# Patient Record
Sex: FEMALE | Race: WHITE
[De-identification: names, ages, dates, MRNs, and addresses within clinical notes are randomized per-mention and may not be internally consistent; named-entity substitution may affect disease eponyms.]

---

## 2017-04-13 ENCOUNTER — HOSPITAL ENCOUNTER (OUTPATIENT)
Dept: HOSPITAL 62 - WI | Age: 42
End: 2017-04-13
Attending: OBSTETRICS & GYNECOLOGY
Payer: MEDICAID

## 2017-04-13 DIAGNOSIS — Z12.31: Primary | ICD-10-CM

## 2017-04-13 PROCEDURE — G0202 SCR MAMMO BI INCL CAD: HCPCS

## 2017-04-13 PROCEDURE — 77067 SCR MAMMO BI INCL CAD: CPT

## 2017-04-14 ENCOUNTER — HOSPITAL ENCOUNTER (EMERGENCY)
Dept: HOSPITAL 62 - ER | Age: 42
Discharge: HOME | End: 2017-04-14
Payer: MEDICAID

## 2017-04-14 VITALS — SYSTOLIC BLOOD PRESSURE: 140 MMHG | DIASTOLIC BLOOD PRESSURE: 70 MMHG

## 2017-04-14 DIAGNOSIS — Z87.891: ICD-10-CM

## 2017-04-14 DIAGNOSIS — Z98.51: ICD-10-CM

## 2017-04-14 DIAGNOSIS — R05: ICD-10-CM

## 2017-04-14 DIAGNOSIS — I10: ICD-10-CM

## 2017-04-14 DIAGNOSIS — R11.10: ICD-10-CM

## 2017-04-14 DIAGNOSIS — J40: Primary | ICD-10-CM

## 2017-04-14 DIAGNOSIS — R73.03: ICD-10-CM

## 2017-04-14 PROCEDURE — 99283 EMERGENCY DEPT VISIT LOW MDM: CPT

## 2017-04-14 NOTE — ER DOCUMENT REPORT
ED Respiratory Problem





- General


Chief Complaint: Productive Cough


Stated Complaint: COUGH


Notes: 


Patient sense emergency department complaining of 2 weeks of congested cough. 

She has nasal congestion as well. She went to an urgent care about a week ago 

and they started her on an antibiotic and has not helped. She tried using her 

friend's albuterol inhaler and doesn't feel like it is helping. The patient 

also started having coughing spells that resulted in her vomiting. She's had 

pain around her lower ribs on both sides from the coughing but that's been 

easing up. She is also had some pain in her hernia from the coughing as well. 

The patient says that yesterday she started having some diarrhea. Her 

significant other is also having diarrhea and vomiting presently. These is that 

she's felt subjective fever starting yesterday. She denies any other chest pain 

or difficult to breathing. She denies any history of asthma. She quit smoking 

back in December. She has had an albuterol inhaler prescribed to her once 

previously. The patient denies any. She denies any rashes or swelling. She 

denies any urinary symptoms. Denies pregnancy.


TRAVEL OUTSIDE OF THE [place] IN LAST 30 DAYS: No





- Related Data


Allergies/Adverse Reactions: 


 





chlorpheniramine [From [employer]] Allergy (Severe, Verified 17 13:41)


 headache, n and v


dextromethorphan tannate [From [facility]] Allergy (Severe, Verified 17 13:

41)


 headache, n and v


pseudoephedrine tannate [From [employer]] Allergy (Severe, Verified 17 13:41

)


 headache, n and v








Home Medications: 


 Current Home Medications





Cefdinir [Omnicef 300 mg Capsule] 1 cap PO BID 17 [History]


Guaifenesin [Tussin] 100 mg PO PRN PRN 17 [History]


Guaifenesin/Dextromethorphan [Mucus Relief Dm Tablet] 1 each PO 17 [

History]


Omeprazole 20 mg PO DAILY 17 [History]


Pantoprazole Sodium [Protonix] 20 mg PO DAILY 17 [History]











Past Medical History





- Social History


Smoking Status: Former Smoker


Lives with: Spouse/Significant other


Family History: CAD, DM, Hypertension, Thyroid Disfunction


Patient has suicidal ideation: No


Patient has homicidal ideation: No





- Past Medical History


Cardiac Medical History: Reports: Hx Hypertension - no current meds


   Denies: Hx Coronary Artery Disease, Hx Heart Attack


Pulmonary Medical History: Reports: Hx Bronchitis -  hx of, Hx Pneumonia - hx of


   Denies: Hx Asthma, Hx COPD


Neurological Medical History: Denies: Hx Cerebrovascular Accident, Hx Seizures


Endocrine Medical History: Reports: Hx Diabetes Mellitus Type 2 - "borderline" 

per pt


Renal/ Medical History: Denies: Hx Peritoneal Dialysis


GI Medical History: Reports: Hx Gastroesophageal Reflux Disease


Musculoskeltal Medical History: Denies Hx Arthritis


Psychiatric Medical History: Reports: Hx Depression - anxiety


Past Surgical History: Reports: Hx  Section - x2, Hx Gynecologic 

Surgery - novasure, vag repair, Hx Tonsillectomy, Hx Tubal Ligation





- Immunizations


Immunizations up to date: No


Hx Diphtheria, Pertussis, Tetanus Vaccination: No





Review of Systems





- Review of Systems


Notes: 


A total of 11 systems were reviewed. Pertinent positives and negatives are 

listed in the history of present illness.





Physical Exam





- Vital signs


Vitals: 


 











Temp Pulse Resp BP Pulse Ox


 


 98.3 F   73   22 H  139/69 H  99 


 


 17 13:18  17 13:18  17 13:18  17 13:18  17 13:18














- Notes


Notes: 


General presentation:  Well developed alert patient  who does not appear 

acutely ill or toxic.  


HEAD: Normocephalic and atraumatic.


ENT:  Neck is supple. Moist mucous membranes.  No pharyngeal erythema edema or 

exudates. Hearing intact to normal voice.  


Eye:  Pupils are equal, round.  Lids normal. Conjunctiva clear.  


Pulmonary: No respiratory distress. Inspiratory and expiratory wheezes 

bilaterally with good air excursion without rales, rhonchi.  


Circulatory:  Regular rate no significant murmurs, rubs, or gallop.  


Abdomen: Soft, nontender abdomen.  No palpable organomegaly or masses. Normal 

bowel sounds.


Neurologic:  Awake, alert, and oriented.  Grossly normal and symmetrical 

strength.  


MSK:  Normal strength and range of motion.  No swelling or tenderness.  


Skin:  Skin is warm and dry with no lesions or rash.  


Psychiatric:  Normal mood, affect, and cognition.  





Course





- Re-evaluation


Re-evalutation: 


17 14:10


Patient with cough for 2 weeks with history of smoking. Symptoms most 

consistent with bronchitis. She is not hypoxic. No respiratory distress. We'll 

treat with albuterol and steroids. Regarding the new onset of diarrhea, patient 

may have developed GI bug or this may be secondary to the antibiotics that the 

urgent care gave her 4 the bronchitis last week. She does not appear 

dehydrated. We'll recommend by mouth fluids





17 15:05


Patient remained stable. She has improved aeration with 2 puffs of albuterol. 

She has had the prednisone. I have counseled her regarding management of her 

symptoms. She will be discharged home.





- Vital Signs


Vital signs: 


 











Temp Pulse Resp BP Pulse Ox


 


 98.3 F   73   22 H  139/69 H  99 


 


 17 13:18  17 13:18  17 13:18  17 13:18  17 13:18














Discharge





- Discharge


Clinical Impression: 


 Bronchitis





Instructions:  Bronchitis ([facility])


Additional Instructions: 


Use inhaler every 4 hours as needed for wheezing and cough. Try Mucinex through 

the day to help thin secretions to cough sputum up. Use prednisone daily with 

next dose tomorrow with breakfast or lunch. Return for fevers not controlled by 

Tylenol and Motrin, worsening difficult to breathing, chest pain, or other 

worsening or concerning symptoms.














Bronchitis





     You have acute bronchitis.  This disease is an infection or inflammation 

of the air passageways in your lungs.  Symptoms usually include cough, low 

grade fever, shortness of breath, and wheezing.  The cough usually persists for 

a couple of weeks.


     Most cases of bronchitis get better without antibiotics.  We prescribe 

antibiotics when we believe bacteria are damaging your airways, or if there's 

high risk the bronchitis will worsen into pneumonia.


     Increase your fluid intake. A cool mist humidifier may make your lungs 

more comfortable.  An expectorant (cough medicine that loosens phlegm) can 

help.  If you smoke, STOP!!!  Recovery from bronchitis can be somewhat slow, 

but you should see improvement within a day or two.


     Repeated episodes of bronchitis may result in lung damage -- for example, 

chronic bronchitis, recurrent pneumonias, or emphysema.


     Call the doctor if you develop increasing fever, shortness of breath, 

chest pain, bloody sputum, or otherwise worsen.  If you have not improved at 

all after several days, contact the physician.








Prescriptions: 


Albuterol Sulfate [Proair HFA Inhalation Aerosol 8.5 gm MDI] 2 puff IH Q4H PRN #

1 mdi


 PRN Reason: 


Prednisone [Deltasone 20 mg Tablet] 3 tab PO DAILY 5 Days

## 2017-10-30 ENCOUNTER — HOSPITAL ENCOUNTER (OUTPATIENT)
Dept: HOSPITAL 62 - OD | Age: 42
End: 2017-10-30
Attending: FAMILY MEDICINE
Payer: MEDICAID

## 2017-10-30 DIAGNOSIS — B34.9: Primary | ICD-10-CM

## 2017-10-30 PROCEDURE — 87804 INFLUENZA ASSAY W/OPTIC: CPT

## 2019-06-09 ENCOUNTER — HOSPITAL ENCOUNTER (EMERGENCY)
Dept: HOSPITAL 62 - ER | Age: 44
Discharge: HOME | End: 2019-06-09
Payer: MEDICAID

## 2019-06-09 VITALS — SYSTOLIC BLOOD PRESSURE: 145 MMHG | DIASTOLIC BLOOD PRESSURE: 76 MMHG

## 2019-06-09 DIAGNOSIS — K21.9: ICD-10-CM

## 2019-06-09 DIAGNOSIS — Z98.51: ICD-10-CM

## 2019-06-09 DIAGNOSIS — I10: ICD-10-CM

## 2019-06-09 DIAGNOSIS — K43.9: Primary | ICD-10-CM

## 2019-06-09 DIAGNOSIS — Z79.899: ICD-10-CM

## 2019-06-09 DIAGNOSIS — R11.2: ICD-10-CM

## 2019-06-09 DIAGNOSIS — Z88.8: ICD-10-CM

## 2019-06-09 DIAGNOSIS — R10.32: ICD-10-CM

## 2019-06-09 DIAGNOSIS — Z98.84: ICD-10-CM

## 2019-06-09 LAB
ADD MANUAL DIFF: NO
ALBUMIN SERPL-MCNC: 4.7 G/DL (ref 3.5–5)
ALP SERPL-CCNC: 88 U/L (ref 38–126)
ALT SERPL-CCNC: 25 U/L (ref 9–52)
ANION GAP SERPL CALC-SCNC: 8 MMOL/L (ref 5–19)
APPEARANCE UR: (no result)
APTT PPP: YELLOW S
AST SERPL-CCNC: 21 U/L (ref 14–36)
BASOPHILS # BLD AUTO: 0.1 10^3/UL (ref 0–0.2)
BASOPHILS NFR BLD AUTO: 0.5 % (ref 0–2)
BILIRUB DIRECT SERPL-MCNC: 0.2 MG/DL (ref 0–0.4)
BILIRUB SERPL-MCNC: 0.7 MG/DL (ref 0.2–1.3)
BILIRUB UR QL STRIP: NEGATIVE
BUN SERPL-MCNC: 15 MG/DL (ref 7–20)
CALCIUM: 10.1 MG/DL (ref 8.4–10.2)
CHLORIDE SERPL-SCNC: 102 MMOL/L (ref 98–107)
CO2 SERPL-SCNC: 28 MMOL/L (ref 22–30)
EOSINOPHIL # BLD AUTO: 0.1 10^3/UL (ref 0–0.6)
EOSINOPHIL NFR BLD AUTO: 1.2 % (ref 0–6)
ERYTHROCYTE [DISTWIDTH] IN BLOOD BY AUTOMATED COUNT: 13.1 % (ref 11.5–14)
GLUCOSE SERPL-MCNC: 147 MG/DL (ref 75–110)
GLUCOSE UR STRIP-MCNC: NEGATIVE MG/DL
HCT VFR BLD CALC: 41.1 % (ref 36–47)
HGB BLD-MCNC: 13.9 G/DL (ref 12–15.5)
KETONES UR STRIP-MCNC: (no result) MG/DL
LIPASE SERPL-CCNC: 93.2 U/L (ref 23–300)
LYMPHOCYTES # BLD AUTO: 1.4 10^3/UL (ref 0.5–4.7)
LYMPHOCYTES NFR BLD AUTO: 13.4 % (ref 13–45)
MCH RBC QN AUTO: 28.2 PG (ref 27–33.4)
MCHC RBC AUTO-ENTMCNC: 33.9 G/DL (ref 32–36)
MCV RBC AUTO: 83 FL (ref 80–97)
MONOCYTES # BLD AUTO: 0.4 10^3/UL (ref 0.1–1.4)
MONOCYTES NFR BLD AUTO: 4.1 % (ref 3–13)
NEUTROPHILS # BLD AUTO: 8.5 10^3/UL (ref 1.7–8.2)
NEUTS SEG NFR BLD AUTO: 80.8 % (ref 42–78)
NITRITE UR QL STRIP: NEGATIVE
PH UR STRIP: 7 [PH] (ref 5–9)
PLATELET # BLD: 275 10^3/UL (ref 150–450)
POTASSIUM SERPL-SCNC: 4.8 MMOL/L (ref 3.6–5)
PROT SERPL-MCNC: 7.9 G/DL (ref 6.3–8.2)
PROT UR STRIP-MCNC: 30 MG/DL
RBC # BLD AUTO: 4.94 10^6/UL (ref 3.72–5.28)
SODIUM SERPL-SCNC: 138.2 MMOL/L (ref 137–145)
SP GR UR STRIP: 1.03
TOTAL CELLS COUNTED % (AUTO): 100 %
UROBILINOGEN UR-MCNC: 4 MG/DL (ref ?–2)
WBC # BLD AUTO: 10.6 10^3/UL (ref 4–10.5)

## 2019-06-09 PROCEDURE — 74177 CT ABD & PELVIS W/CONTRAST: CPT

## 2019-06-09 PROCEDURE — 83605 ASSAY OF LACTIC ACID: CPT

## 2019-06-09 PROCEDURE — 96374 THER/PROPH/DIAG INJ IV PUSH: CPT

## 2019-06-09 PROCEDURE — 80053 COMPREHEN METABOLIC PANEL: CPT

## 2019-06-09 PROCEDURE — 96361 HYDRATE IV INFUSION ADD-ON: CPT

## 2019-06-09 PROCEDURE — 96375 TX/PRO/DX INJ NEW DRUG ADDON: CPT

## 2019-06-09 PROCEDURE — 85025 COMPLETE CBC W/AUTO DIFF WBC: CPT

## 2019-06-09 PROCEDURE — 81001 URINALYSIS AUTO W/SCOPE: CPT

## 2019-06-09 PROCEDURE — 83690 ASSAY OF LIPASE: CPT

## 2019-06-09 PROCEDURE — 99285 EMERGENCY DEPT VISIT HI MDM: CPT

## 2019-06-09 PROCEDURE — 74022 RADEX COMPL AQT ABD SERIES: CPT

## 2019-06-09 PROCEDURE — 36415 COLL VENOUS BLD VENIPUNCTURE: CPT

## 2019-06-09 NOTE — ER DOCUMENT REPORT
ED General





- General


Chief Complaint: Abdominal Pain


Stated Complaint: LEFT SIDE PAIN/VOMITING


Time Seen by Provider: 19 00:13


Primary Care Provider: 


MAREN VILLALTA DO [NO LOCAL MD] - Follow up as needed


Notes: 





Patient is a 43-year-old female with a past medical history of morbid obesity, 

chronic large ventral abdominal hernia, status post gastric sleeve, presents 

with worsening pain to her knee particularly in the left lower quadrant.  The 

patient states that she constantly has a very large ventral hernia often has a 

increased prominence in the left lower quadrant although feels like it is more 

bulged out than normal.  She states several hours prior to presentation she 

began having a dull, throbbing, constant discomfort to the area that became 

progressively more painful over the last 4 to 5 hours now a severe pain.  States

that she vomited multiple times since the pain started.  She did try taking 

Pepto-Bismol with no relief.  She states that she has had similar pain in the 

past although this might be worse than any pain she is ever had in regards to 

the hernia and she has never vomited in the past with this pain.  She states the

vomiting is what did prompted her to come to the emergency department.  She has 

not had fever or constitutional symptoms.  No obvious triggering factor tonight.

 She has not seen her primary care doctor regarding today's concerns.


TRAVEL OUTSIDE OF THE [place] IN LAST 30 DAYS: No





- Related Data


Allergies/Adverse Reactions: 


                                        





chlorpheniramine [From [facility]] Allergy (Severe, Verified 17 13:41)


   headache, n and v


dextromethorphan tannate [From [facility]] Allergy (Severe, Verified 17 13:

41)


   headache, n and v


pseudoephedrine tannate [From [facility]] Allergy (Severe, Verified 17 13

:41)


   headache, n and v











Past Medical History





- General


Information source: Patient





- Social History


Smoking Status: Never Smoker


Frequency of alcohol use: None


Drug Abuse: None


Lives with: Family


Family History: CAD, DM, Hypertension, Thyroid Disfunction





- Past Medical History


Cardiac Medical History: Reports: Hx Hypertension - no current meds


   Denies: Hx Coronary Artery Disease, Hx Heart Attack


Pulmonary Medical History: Reports: Hx Bronchitis -  hx of, Hx Pneumonia - hx of


   Denies: Hx Asthma, Hx COPD


Neurological Medical History: Denies: Hx Cerebrovascular Accident, Hx Seizures


Endocrine Medical History: Reports: Hx Diabetes Mellitus Type 2 - "borderline" 

per pt


Renal/ Medical History: Denies: Hx Peritoneal Dialysis


GI Medical History: Reports: Hx Gastroesophageal Reflux Disease


Musculoskeletal Medical History: Denies Hx Arthritis


Psychiatric Medical History: Reports: Hx Depression - anxiety


Past Surgical History: Reports: Hx  Section - x2, Hx Gynecologic Surgery

- novasure, vag repair, Hx Tonsillectomy, Hx Tubal Ligation





- Immunizations


Immunizations up to date: No


Hx Diphtheria, Pertussis, Tetanus Vaccination: No





Review of Systems





- Review of Systems


Notes: 





Constitutional: Negative for fever.


HENT: Negative for sore throat.


Eyes: Negative for visual changes.


Cardiovascular: Negative for chest pain.


Respiratory: Negative for shortness of breath.


Gastrointestinal: Positive for abdominal pain, nausea and vomiting


Genitourinary: Negative for dysuria.


Musculoskeletal: Negative for back pain.


Skin: Negative for rash.


Neurological: Negative for headaches, weakness or numbness.





10 point ROS negative except as marked above and in HPI.





Physical Exam





- Vital signs


Vitals: 


                                        











Temp Pulse Resp BP Pulse Ox


 


 98.1 F   89   20   172/88 H  100 


 


 19 00:09  19 00:09  19 00:09  19 00:19 00:09











Interpretation: Hypertensive


Notes: 





PHYSICAL EXAMINATION:





GENERAL: Appears very uncomfortable





HEAD: Atraumatic, normocephalic.





EYES: Pupils equal round and reactive to light, extraocular movements intact, 

sclera anicteric, conjunctiva are normal.





ENT: nares patent, oropharynx clear without exudates.  Moist mucous membranes.





NECK: Normal range of motion, supple without lymphadenopathy





LUNGS: Breath sounds clear to auscultation bilaterally and equal.  No wheezes 

rales or rhonchi.





HEART: Regular rate and rhythm without murmurs





ABDOMEN: Morbidly obese abdomen, extremely large ventral hernia with an 

increased protuberance in the left lower quadrant portion of the ventral hernia.

 There is diffuse tenderness on palpation of the hernia although it is overall 

soft and compressible including over the left lower quadrant.  Bowel sounds are 

present.





EXTREMITIES: Normal range of motion, no pitting or edema.  No cyanosis.





NEUROLOGICAL: No focal neurological deficits. Moves all extremities 

spontaneously and on command.





PSYCH: Normal mood, normal affect.





SKIN: Warm, Dry, normal turgor, no rashes or lesions noted.





Course





- Re-evaluation


Re-evalutation: 





19 01:02


Patient presents with severe left-sided lower abdominal pain.  Has a very large 

ventral abdominal hernia that is constantly present but there is an area of 

bulging to the left lower abdomen that patient states is always there but does 

not usually protrude this much.  The patient has exquisite tenderness on 

palpation of this area although it is soft and compressible.  She has had 

recurrent episodes of vomiting.  I did immediately contact the surgeon on-call 

Dr. Saul due to concerns of possible incarcerated hernia.  He requested CT 

scan of the abdomen and pelvis prior to formally consulting and evaluating the 

patient.  This will be completed at his request.  Labs pending.  Will provide 

analgesia, IV fluids and reassess the patient.


19 04:17


CT scan completed.  Dr. Saul has consulted on the patient, advises patient 

is safe for discharge home.  Patient does appear much more comfortable, no 

longer in any kind of distress.  At this time will discharge with return precaut

ions and follow-up recommendations.  Verbal discharge instructions given a the 

bedside and opportunity for questions given. Medication warnings reviewed. 

Patient is in agreement with this plan and has verbalized understanding of 

return precautions and the need for primary care follow-up in the next 24-72 

hours.





- Vital Signs


Vital signs: 


                                        











Temp Pulse Resp BP Pulse Ox


 


 98.1 F   89   20   172/88 H  100 


 


 19 00:09  19 00:09  19 00:09  19 00:09  19 00:09














- Laboratory


Result Diagrams: 


                                 19 00:15





                                 19 00:15


Laboratory results interpreted by me: 


                                        











  19





  00:15 00:15 00:15


 


WBC  10.6 H  


 


Seg Neutrophils %  80.8 H  


 


Absolute Neutrophils  8.5 H  


 


Glucose   147 H 


 


Urine Protein    30 H


 


Urine Ketones    TRACE H


 


Urine Urobilinogen    4.0 H


 


Ur Leukocyte Esterase    LARGE H














- Diagnostic Test


Radiology reviewed: Reports reviewed





Discharge





- Discharge


Clinical Impression: 


Ventral hernia


Qualifiers:


 Obstruction and gangrene presence: without obstruction or gangrene Qualified 

Code(s): K43.9 - Ventral hernia without obstruction or gangrene





Abdominal pain


Qualifiers:


 Abdominal location: unspecified location Qualified Code(s): R10.9 - Unspecified

abdominal pain





Nausea and vomiting


Qualifiers:


 Vomiting type: unspecified Vomiting Intractability: non-intractable Qualified 

Code(s): R11.2 - Nausea with vomiting, unspecified





Condition: Good


Disposition: HOME, SELF-CARE


Additional Instructions: 


Keep a clear liquid diet for the next 48 hours.  Take stool softeners 2 tabs 

docusate twice daily.  Return if you have worsening pain, fever, persistent 

vomiting, pass out, or have any other symptoms that are worrisome to you.


Referrals: 


MAREN VILLALTA DO [NO LOCAL MD] - Follow up in 3-5 days

## 2019-06-09 NOTE — RADIOLOGY REPORT ([facility])
CLINICAL HISTORY:  SHARP ABD PAIN, HX BYPASS, VOMITING 



COMPARISON: None.



TECHNIQUE: XR ABDOMEN SUPINE AND ERECT WITH CHEST (ABD ACUTE

SERIES) 6/9/2019 12:00 AM CDT



FINDINGS: 



Bowel gas pattern is nonspecific. There are no abnormal

radiopaque foreign bodies or abnormal calcifications. There are

mild degenerative changes most of the heart is normal in size.

Lungs are clear.



IMPRESSION: 



No bowel obstruction.

## 2019-06-09 NOTE — RADIOLOGY REPORT ([facility])
CLINICAL HISTORY:  abdominal pain, vomiting, ?incarcerated hernia





COMPARISON: None.



TECHNIQUE: CT ABDOMEN PELVIS WITH IV CONTRAST on 6/9/2019 12:51

AM CDT



This exam was performed according to our departmental

dose-optimization program, which includes automated exposure

control, adjustment of the mA and/or kV according to patient size

and/or use of iterative reconstruction technique.



FINDINGS: 



Lower lungs are clear.



Abdomen: The liver is normal in appearance. There is no biliary

dilatation. Gallbladder is normal in appearance. There are

postoperative changes of the upper stomach. The pancreas and

spleen are normal in appearance. The adrenal glands and kidneys

are unremarkable.



Abdominal aorta is normal in course and caliber without aneurysm.

There is no free air. There is no retroperitoneal adenopathy.



Pelvis: There is dilatation of small bowel loops proximal to the

femoral hernias component. Distal small bowel is decompressed.

Urinary bladder is unremarkable. There is no free fluid. Uterus

is normal in size. Appendix is not clearly seen. There are large

complex ventral hernias extending into the pannus.



Skeleton: There are no acute osseous findings. No suspicious bony

lesions.



IMPRESSION: 



Complex ventral hernia containing multiple bowel loops. Small

bowel obstruction may secondarily

## 2019-06-09 NOTE — ER DOCUMENT REPORT
ED Medical Screen (RME)





- General


Chief Complaint: Abdominal Pain


Stated Complaint: LEFT SIDE PAIN/VOMITING


Time Seen by Provider: 19 00:13


Primary Care Provider: 


MAREN VILLALTA DO [Primary Care Provider] - Follow up as needed


Notes: 





43-year-old female chief complaint of severe mid to left-sided abdominal pain 

and vomiting.  She has a history of gastric bypass, she has a known very large 

hernia over the lower abdomen generally with large amount of intestines in the 

hernia persistently, had a gastric bypass to lose weight so she could have this 

repaired at [facility].  Denies fever but states she is started vomiting with 

severe pain today.


TRAVEL OUTSIDE OF THE [place] IN LAST 30 DAYS: No





- Related Data


Allergies/Adverse Reactions: 


                                        





chlorpheniramine [From [facility]] Allergy (Severe, Verified 17 13:41)


   headache, n and v


dextromethorphan tannate [From [facility]] Allergy (Severe, Verified 17 

13:41)


   headache, n and v


pseudoephedrine tannate [From [facility]] Allergy (Severe, Verified 17 

13:41)


   headache, n and v











Past Medical History





- Past Medical History


Cardiac Medical History: Reports: Hx Hypertension - no current meds


   Denies: Hx Coronary Artery Disease, Hx Heart Attack


Pulmonary Medical History: Reports: Hx Bronchitis -  hx of, Hx Pneumonia - hx of


   Denies: Hx Asthma, Hx COPD


Neurological Medical History: Denies: Hx Cerebrovascular Accident, Hx Seizures


Endocrine Medical History: Reports: Hx Diabetes Mellitus Type 2 - "borderline" 

per pt


Renal/ Medical History: Denies: Hx Peritoneal Dialysis


GI Medical History: Reports: Hx Gastroesophageal Reflux Disease


Musculoskeltal Medical History: Denies Hx Arthritis


Psychiatric Medical History: Reports: Hx Depression - anxiety


Past Surgical History: Reports: Hx  Section - x2, Hx Gynecologic Surgery

- novasure, vag repair, Hx Tonsillectomy, Hx Tubal Ligation





- Immunizations


Immunizations up to date: No


Hx Diphtheria, Pertussis, Tetanus Vaccination: No





Physical Exam





- Vital signs


Vitals: 





                                        











Temp Pulse Resp BP Pulse Ox


 


 98.1 F   89   20   172/88 H  100 


 


 19 00:09  19 00:09  19 00:09  19 00:09  19 00:09














- Abdominal


Tenderness: Tender - Very large distention of the lower abdomen, tenderness is 

actually worst above this in the left upper quadrant and mid abdomen, I do not 

see an incarcerated hernia on exam.





Course





- Re-evaluation


Re-evalutation: 


Patient appears to be guarding in the left upper quadrant and then mid abdomen 

although exam is limited by body habitus and sitting position.  Because of 

patient's level of distress with abdominal pain, history of gastric bypass and 

large hernia, she was upgraded to triage level 2





I have greeted and performed a rapid initial assessment of this patient.  A 

comprehensive ED assessment and evaluation of the patient, analysis of test 

results and completion of the medical decision making process will be conducted 

by additional ED providers.





- Vital Signs


Vital signs: 





                                        











Temp Pulse Resp BP Pulse Ox


 


 98.1 F   89   20   172/88 H  100 


 


 19 00:09  19 00:09  19 00:09  19 00:19 00:09














Doctor's Discharge





- Discharge


Referrals: 


MAREN VILLALTA DO [Primary Care Provider] - Follow up as needed

## 2019-06-09 NOTE — PDOC CONSULTATION
Consultation


Consult Date: 19


Provider Consulted: LLOYD CHURCH


Consult reason:: Abdominal pain ventral hernia





History of Present Illness


Admission Date/PCP: 


  





  KIKI REYNOLDS MD





History of Present Illness: 


YESSENIA TREVINO is a 43 year old female who presents with left-sided abdominal 

pain and nausea.  She has a long-standing history of a very large lower 

abdominal ventral hernia that resulted from previous  surgery.





The dates back to  last seen in the emergency room here.  She has undergone 

bariatric surgery and attempts at weight loss so that the hernia could be 

repaired.  She has seen multiple surgeons suggested that she lose weight that 

the hernia could not be repaired until she lost some significant amount of 

weight.  This resulted in bariatric surgery done at [facility] in 

[place] approximately 5 years ago.


Patient is only lost 15 to 25 pounds as her gastric sleeve surgery.


Tonight with increasing left-sided abdominal pain over the course of the last 

couple days has not really had a significant bowel movement however has been 

passing flatus.  Denies any severe abdominal pain however her pain is localized 

to the left side which she said is usual for her but over the last couple days 

has been somewhat worse.





Past Medical History


Cardiac Medical History: Reports: Hypertension - no current meds


   Denies: Coronary Artery Disease, Myocardial Infarction


Pulmonary Medical History: Reports: Bronchitis -  hx of, Pneumonia - hx of


   Denies: Asthma, Chronic Obstructive Pulmonary Disease (COPD)


Neurological Medical History: 


   Denies: Seizures


Endocrine Medical History: Reports: Diabetes Mellitus Type 2 - "borderline" per 

pt


GI Medical History: Reports: Gastroesophageal Reflux Disease


Musculoskeltal Medical History: 


   Denies: Arthritis


Psychiatric Medical History: Reports: Depression - anxiety


Hematology: 


   Denies: Anemia





Past Surgical History


Past Surgical History: Reports:  Section - x2, Tonsillectomy, Tubal 

Ligation, Other - Gastric sleeve surgery





Social History


Smoking Status: Former Smoker


Frequency of Alcohol Use: Rare


Hx Recreational Drug Use: No


Hx Prescription Drug Abuse: No





Family History


Family History: CAD, DM, Hypertension, Thyroid Disfunction


Parental Family History Reviewed: No


Children Family History Reviewed: NA


Sibling(s) Family History Reviewed.: NA





Medication/Allergy


Home Medications: 








Albuterol Sulfate [Proair HFA Inhalation Aerosol 8.5 gm MDI] 2 puff IH Q4H PRN 

#1 mdi 17 


Cefdinir [Omnicef 300 mg Capsule] 1 cap PO BID 17 


Guaifenesin [Tussin] 100 mg PO PRN PRN 17 


Guaifenesin/Dextromethorphan [Mucus Relief Dm Tablet] 1 each PO 17 


Omeprazole 20 mg PO DAILY 17 


Pantoprazole Sodium [Protonix] 20 mg PO DAILY 17 


Prednisone [Deltasone 20 mg Tablet] 3 tab PO DAILY 5 Days  tablet 17 








Allergies/Adverse Reactions: 


                                        





chlorpheniramine [From [employer] ] Allergy (Severe, Verified 17 13:41)


   headache, n and v


dextromethorphan tannate [From [employer] ] Allergy (Severe, Verified 17 

13:41)


   headache, n and v


pseudoephedrine tannate [From [employer] ] Allergy (Severe, Verified 17 

13:41)


   headache, n and v











Physical Exam


Vital Signs: 


                                        











Temp Pulse Resp BP Pulse Ox


 


 98.1 F   89   20   172/88 H  100 


 


 19 00:09  19 00:09  19 00:09  19 00:09  19 00:09








                                 Intake & Output











 19





 06:59 06:59 06:59


 


Intake Total   1000


 


Balance   1000


 


Weight   129.1 kg














Results


Laboratory Results: 


                                        





                                 19 00:15 





                                 19 00:15 





                                        











  19





  00:15 00:15 00:15


 


WBC  10.6 H  


 


RBC  4.94  


 


Hgb  13.9  


 


Hct  41.1  


 


MCV  83  


 


MCH  28.2  


 


MCHC  33.9  


 


RDW  13.1  


 


Plt Count  275  


 


Seg Neutrophils %  80.8 H  


 


Lymphocytes %  13.4  


 


Monocytes %  4.1  


 


Eosinophils %  1.2  


 


Basophils %  0.5  


 


Absolute Neutrophils  8.5 H  


 


Absolute Lymphocytes  1.4  


 


Absolute Monocytes  0.4  


 


Absolute Eosinophils  0.1  


 


Absolute Basophils  0.1  


 


Sodium   138.2 


 


Potassium   4.8 


 


Chloride   102 


 


Carbon Dioxide   28 


 


Anion Gap   8 


 


BUN   15 


 


Creatinine   0.58 


 


Est GFR ( Amer)   > 60 


 


Est GFR (Non-Af Amer)   > 60 


 


Glucose   147 H 


 


Lactic Acid    1.0


 


Calcium   10.1 


 


Total Bilirubin   0.7 


 


AST   21 


 


ALT   25 


 


Alkaline Phosphatase   88 


 


Total Protein   7.9 


 


Albumin   4.7 


 


Lipase   93.2 


 


Urine Color   


 


Urine Appearance   


 


Urine pH   


 


Ur Specific Gravity   


 


Urine Protein   


 


Urine Glucose (UA)   


 


Urine Ketones   


 


Urine Blood   


 


Urine Nitrite   


 


Ur Leukocyte Esterase   


 


Urine WBC (Auto)   


 


Urine RBC (Auto)   














  19





  00:15


 


WBC 


 


RBC 


 


Hgb 


 


Hct 


 


MCV 


 


MCH 


 


MCHC 


 


RDW 


 


Plt Count 


 


Seg Neutrophils % 


 


Lymphocytes % 


 


Monocytes % 


 


Eosinophils % 


 


Basophils % 


 


Absolute Neutrophils 


 


Absolute Lymphocytes 


 


Absolute Monocytes 


 


Absolute Eosinophils 


 


Absolute Basophils 


 


Sodium 


 


Potassium 


 


Chloride 


 


Carbon Dioxide 


 


Anion Gap 


 


BUN 


 


Creatinine 


 


Est GFR ( Amer) 


 


Est GFR (Non-Af Amer) 


 


Glucose 


 


Lactic Acid 


 


Calcium 


 


Total Bilirubin 


 


AST 


 


ALT 


 


Alkaline Phosphatase 


 


Total Protein 


 


Albumin 


 


Lipase 


 


Urine Color  YELLOW


 


Urine Appearance  SLIGHTLY-CLOUDY


 


Urine pH  7.0


 


Ur Specific Gravity  1.028


 


Urine Protein  30 H


 


Urine Glucose (UA)  NEGATIVE


 


Urine Ketones  TRACE H


 


Urine Blood  NEGATIVE


 


Urine Nitrite  NEGATIVE


 


Ur Leukocyte Esterase  LARGE H


 


Urine WBC (Auto)  5


 


Urine RBC (Auto)  3











Impressions: 


                                        





Acute Abdomen Series  19 00:00


IMPRESSION: 


 


No bowel obstruction.


 








Abdomen/Pelvis CT  19 00:51


IMPRESSION: 


 


Complex ventral hernia containing multiple bowel loops. Small


bowel obstruction may secondarily


 














Assessment & Plan





- Plan Summary


Plan Summary: 





Impression: Long-standing ventral hernia with chronic abdominal pain status post

gastric sleeve surgery.


Her abdominal pain has markedly improved since her emergency room stay





Long discussion with the patient and family we went over the CT scan findings





I explained that any type of attempted repair of this large ventral hernia at 

this point not any significant weight loss we will almost 100% result in failure





Plain that her left-sided abdominal pain right now is secondary not to a bowel 

obstruction but to the hernia defect and the small bowel and colon sliding over 

the edges of her remaining fascia.





Patient also complains of chronic constipation she exacerbates her current 

problem





After long discussion with the patient and her family is encouraged to follow-up

with her bariatric program they may be able to assist her with a type of back on

track program for further weight loss.





Short-term she is instructed to stay on a clear liquid diet for the next 48 

hours and start taking stool softeners for the constipation





She is also instructed to return here should she start developing increasing 

nausea and vomiting and increasing abdominal pain

## 2019-07-02 ENCOUNTER — HOSPITAL ENCOUNTER (EMERGENCY)
Dept: HOSPITAL 62 - ER | Age: 44
Discharge: HOME | End: 2019-07-02
Payer: MEDICAID

## 2019-07-02 VITALS — SYSTOLIC BLOOD PRESSURE: 144 MMHG | DIASTOLIC BLOOD PRESSURE: 79 MMHG

## 2019-07-02 DIAGNOSIS — Z88.8: ICD-10-CM

## 2019-07-02 DIAGNOSIS — I10: ICD-10-CM

## 2019-07-02 DIAGNOSIS — R19.7: ICD-10-CM

## 2019-07-02 DIAGNOSIS — J44.9: ICD-10-CM

## 2019-07-02 DIAGNOSIS — K43.6: Primary | ICD-10-CM

## 2019-07-02 DIAGNOSIS — K21.9: ICD-10-CM

## 2019-07-02 DIAGNOSIS — Z79.899: ICD-10-CM

## 2019-07-02 DIAGNOSIS — D72.829: ICD-10-CM

## 2019-07-02 DIAGNOSIS — Z98.84: ICD-10-CM

## 2019-07-02 DIAGNOSIS — R11.2: ICD-10-CM

## 2019-07-02 DIAGNOSIS — E66.01: ICD-10-CM

## 2019-07-02 DIAGNOSIS — Z87.891: ICD-10-CM

## 2019-07-02 DIAGNOSIS — R73.9: ICD-10-CM

## 2019-07-02 DIAGNOSIS — Z98.890: ICD-10-CM

## 2019-07-02 LAB
ADD MANUAL DIFF: NO
ALBUMIN SERPL-MCNC: 4.7 G/DL (ref 3.5–5)
ALP SERPL-CCNC: 72 U/L (ref 38–126)
ALT SERPL-CCNC: 23 U/L (ref 9–52)
ANION GAP SERPL CALC-SCNC: 12 MMOL/L (ref 5–19)
APPEARANCE UR: (no result)
APTT PPP: YELLOW S
AST SERPL-CCNC: 24 U/L (ref 14–36)
BASOPHILS # BLD AUTO: 0.1 10^3/UL (ref 0–0.2)
BASOPHILS NFR BLD AUTO: 0.7 % (ref 0–2)
BILIRUB DIRECT SERPL-MCNC: 0.4 MG/DL (ref 0–0.4)
BILIRUB SERPL-MCNC: 0.9 MG/DL (ref 0.2–1.3)
BILIRUB UR QL STRIP: NEGATIVE
BUN SERPL-MCNC: 11 MG/DL (ref 7–20)
CALCIUM: 9.5 MG/DL (ref 8.4–10.2)
CHLORIDE SERPL-SCNC: 105 MMOL/L (ref 98–107)
CO2 SERPL-SCNC: 24 MMOL/L (ref 22–30)
EOSINOPHIL # BLD AUTO: 0.1 10^3/UL (ref 0–0.6)
EOSINOPHIL NFR BLD AUTO: 1.1 % (ref 0–6)
ERYTHROCYTE [DISTWIDTH] IN BLOOD BY AUTOMATED COUNT: 13.4 % (ref 11.5–14)
GLUCOSE SERPL-MCNC: 131 MG/DL (ref 75–110)
GLUCOSE UR STRIP-MCNC: NEGATIVE MG/DL
HCT VFR BLD CALC: 41.8 % (ref 36–47)
HGB BLD-MCNC: 14 G/DL (ref 12–15.5)
KETONES UR STRIP-MCNC: NEGATIVE MG/DL
LIPASE SERPL-CCNC: 77.3 U/L (ref 23–300)
LYMPHOCYTES # BLD AUTO: 1.1 10^3/UL (ref 0.5–4.7)
LYMPHOCYTES NFR BLD AUTO: 9.5 % (ref 13–45)
MCH RBC QN AUTO: 28 PG (ref 27–33.4)
MCHC RBC AUTO-ENTMCNC: 33.5 G/DL (ref 32–36)
MCV RBC AUTO: 84 FL (ref 80–97)
MONOCYTES # BLD AUTO: 0.3 10^3/UL (ref 0.1–1.4)
MONOCYTES NFR BLD AUTO: 2.7 % (ref 3–13)
NEUTROPHILS # BLD AUTO: 9.6 10^3/UL (ref 1.7–8.2)
NEUTS SEG NFR BLD AUTO: 86 % (ref 42–78)
NITRITE UR QL STRIP: NEGATIVE
PH UR STRIP: 9 [PH] (ref 5–9)
PLATELET # BLD: 279 10^3/UL (ref 150–450)
POTASSIUM SERPL-SCNC: 4.5 MMOL/L (ref 3.6–5)
PROT SERPL-MCNC: 7.9 G/DL (ref 6.3–8.2)
PROT UR STRIP-MCNC: NEGATIVE MG/DL
RBC # BLD AUTO: 5.01 10^6/UL (ref 3.72–5.28)
SODIUM SERPL-SCNC: 140.5 MMOL/L (ref 137–145)
SP GR UR STRIP: 1.02
TOTAL CELLS COUNTED % (AUTO): 100 %
UROBILINOGEN UR-MCNC: NEGATIVE MG/DL (ref ?–2)
WBC # BLD AUTO: 11.1 10^3/UL (ref 4–10.5)

## 2019-07-02 PROCEDURE — 85025 COMPLETE CBC W/AUTO DIFF WBC: CPT

## 2019-07-02 PROCEDURE — 83690 ASSAY OF LIPASE: CPT

## 2019-07-02 PROCEDURE — 74177 CT ABD & PELVIS W/CONTRAST: CPT

## 2019-07-02 PROCEDURE — 81001 URINALYSIS AUTO W/SCOPE: CPT

## 2019-07-02 PROCEDURE — 80053 COMPREHEN METABOLIC PANEL: CPT

## 2019-07-02 PROCEDURE — 36415 COLL VENOUS BLD VENIPUNCTURE: CPT

## 2019-07-02 PROCEDURE — 96376 TX/PRO/DX INJ SAME DRUG ADON: CPT

## 2019-07-02 PROCEDURE — 74250 X-RAY XM SM INT 1CNTRST STD: CPT

## 2019-07-02 PROCEDURE — 96374 THER/PROPH/DIAG INJ IV PUSH: CPT

## 2019-07-02 PROCEDURE — 96375 TX/PRO/DX INJ NEW DRUG ADDON: CPT

## 2019-07-02 PROCEDURE — 99284 EMERGENCY DEPT VISIT MOD MDM: CPT

## 2019-07-02 NOTE — ER DOCUMENT REPORT
ED General





- General


TRAVEL OUTSIDE OF THE [place] IN LAST 30 DAYS: No





<TODD OLSON A - Last Filed: 19 14:25>





<JAKE MOORE RAY - Last Filed: 19 17:55>





- General


Chief Complaint: Abdominal Pain


Stated Complaint: ABDOMINAL PAIN AND VOMITING


Time Seen by Provider: 19 06:20


Primary Care Provider: 


KIKI REYNOLDS MD [Primary Care Provider] - Follow up as needed





- [facility]


Notes: 





Patient is a 43-year-old female that presents to the emergency department for 

chief complaint of hernia pain.


Patient reports history of left-sided hernia after a .  The hernia has 

been there for years and does frequently give her pain.  She states the last 

time it was this severe was about a month ago.  Patient states last night while 

at rest she had sudden onset of severe sharp pain in her left side where the 

hernia is located.  She states she was passing gas yesterday and did have a 

loose bowel movement this morning.  Patient reports nausea with  multiple 

episodes of vomiting starting last night.  She denies fevers or chills.  She 

denies taking home medications for pain.





Past Medical History: Chronic ventral hernia, COPD, obesity





Past Surgical History: , bariatric surgery





Social History: Reviewed in chart





Family History: Reviewed and noncontributory for presenting illness





Allergies: Reviewed, see documented allergy list.








REVIEW OF SYSTEMS:





CONSTITUTIONAL : 





No fever





No chills





No diaphoresis





No recent illness





EENT:





No vision changes





No congestion





No sore throat  





CARDIOVASCULAR:





No chest pain





No palpitations





RESPIRATORY:





No shortness of breath





No cough





No difficulty breathing





GASTROINTESTINAL: 





 abdominal pain





 nausea





 vomiting





 diarrhea





GENITOURINARY:





No dysuria





No hematuria





No difficulty urinating





MUSCULOSKELETAL:





No back pain





No leg pain





No arm pain





SKIN:  





No rashes





No lesions





LYMPHATIC: 





No swollen, enlarged glands.





NEUROLOGICAL: 





No lightheadedness





No headache





No weakness





No paresthesias





PSYCHIATRIC:





No anxiety





No depression 








PHYSICAL EXAMINATION:





Vital signs reviewed, nursing noted reviewed.





GENERAL: Appears uncomfortable, obese and in no acute distress.





HEAD: Atraumatic, normocephalic.





EYES: Eyes appear normal, extraocular movements intact, sclera anicteric, 

conjunctiva are normal.





ENT: nares patent, oropharynx clear without exudates.  Moist mucous membranes.





NECK: Normal range of motion, supple without lymphadenopathy





LUNGS: Breath sounds clear to auscultation bilaterally and equal.  No wheezes 

rales or rhonchi.





HEART: Regular rate and rhythm without murmurs





ABDOMEN:  large ventral hernia on the left lower abdomen that is firm and tender

to palpation.  No rebound, guarding, or rigidity. 





EXTREMITIES: Nontender, good range of motion, trace pretibial edema bilaterally





NEUROLOGICAL: No focal neurological deficits. Moves all extremities 

spontaneously Motor and sensory grossly intact on exam.





PSYCH: Anxious mood, normal affect.





SKIN: Warm, Dry, normal turgor, no rashes or lesions noted on exposed skin





 (TODD OLSON)





- Related Data


Allergies/Adverse Reactions: 


                                        





chlorpheniramine [From [employer]] Allergy (Severe, Verified 19 06:31)


   headache, n and v


dextromethorphan tannate [From [employer]] Allergy (Severe, Verified 19 

06:31)


   headache, n and v


pseudoephedrine tannate [From [employer]] Allergy (Severe, Verified 19 

06:31)


   headache, n and v


NSAIDS (Non-Steroidal Anti-Inflamma Adverse Reaction (Verified 19 07:39)


   











Past Medical History





- Social History


Smoking Status: Former Smoker


Frequency of alcohol use: Rare


Drug Abuse: None


Family History: CAD, DM, Hypertension, Thyroid Disfunction


Patient has suicidal ideation: No


Patient has homicidal ideation: No





- Past Medical History


Cardiac Medical History: Reports: Hx Hypertension - no current meds


   Denies: Hx Coronary Artery Disease, Hx Heart Attack


Pulmonary Medical History: Reports: Hx Bronchitis -  hx of, Hx Pneumonia - hx of


   Denies: Hx Asthma, Hx COPD


Neurological Medical History: Denies: Hx Cerebrovascular Accident, Hx Seizures


Endocrine Medical History: Reports: Hx Diabetes Mellitus Type 2 - "borderline" 

per pt


Renal/ Medical History: Denies: Hx Peritoneal Dialysis


GI Medical History: Reports: Hx Gastroesophageal Reflux Disease


Musculoskeletal Medical History: Denies Hx Arthritis


Psychiatric Medical History: Reports: Hx Depression - anxiety


Past Surgical History: Reports: Hx  Section - x2, Hx Gynecologic Surgery

- novasure, vag repair, Hx Tonsillectomy, Hx Tubal Ligation, Other - Gastric 

sleeve surgery





- Immunizations


Immunizations up to date: No


Hx Diphtheria, Pertussis, Tetanus Vaccination: No





<TODD OLSON - Last Filed: 19 14:25>





- Vital signs


Vitals: 





                                        











Temp Pulse Resp BP Pulse Ox


 


 98.0 F   83   24 H  153/83 H  99 


 


 19 06:01  19 06:01  19 06:01  19 06:01  19 06:01














Course





- Laboratory


Result Diagrams: 


                                 19 06:35





                                 19 08:09





<OLSONTODD A - Last Filed: 19 14:25>





- Laboratory


Result Diagrams: 


                                 19 06:35





                                 19 08:09





<FRIBELENELLALEELEE M - Last Filed: 19 17:55>





- Re-evaluation


Re-evalutation: 





19 06:39


Vitals reviewed.  Nursing notes reviewed.  Patient is afebrile and nontoxic.  

She does appear uncomfortable and reports nausea.  She was given IV fluids, 

morphine and Zofran for symptomatic management.  Chart review shows a complex 

ventral hernia that was evaluated by Dr. Saul on her previous visit.  

Patient had been recommended significant weight loss prior to being able to 

repair this hernia for concern that repair would fail if she was unable to lose 

the weight.  CT imaging will be obtained to evaluate for possible bowel 

obstruction or incarceration.


19 10:15


Patient's lab work today shows a very mild leukocytosis at 11 and slight 

hyperglycemia but is otherwise unremarkable.  She has normal liver and renal 

function.  On reevaluation patient is ambulating around the room and reports 

significant improvement of her pain.  She does still have tenderness over the 

hernia site.  She has some reported nausea but has not had any vomiting while in

the emergency room.  Her CT scan again shows partial small bowel obstruction 

with most of her small bowel being in this hernia.  I have discussed her care 

with Dr. Diaz and requested his evaluation of her in the emergency room which

he has agreed to.  Currently we are waiting on surgical consultation.  





Laboratory











  19





  06:25 06:35 06:35


 


WBC   11.1 H 


 


RBC   5.01 


 


Hgb   14.0 


 


Hct   41.8 


 


MCV   84 


 


MCH   28.0 


 


MCHC   33.5 


 


RDW   13.4 


 


Plt Count   279 


 


Seg Neutrophils %   86.0 H 


 


Lymphocytes %   9.5 L 


 


Monocytes %   2.7 L 


 


Eosinophils %   1.1 


 


Basophils %   0.7 


 


Absolute Neutrophils   9.6 H 


 


Absolute Lymphocytes   1.1 


 


Absolute Monocytes   0.3 


 


Absolute Eosinophils   0.1 


 


Absolute Basophils   0.1 


 


Sodium    Cancelled


 


Potassium    Cancelled


 


Chloride    Cancelled


 


Carbon Dioxide    Cancelled


 


Anion Gap    Cancelled


 


BUN    Cancelled


 


Creatinine    Cancelled


 


Est GFR ( Amer)    Cancelled


 


Est GFR (Non-Af Amer)    Cancelled


 


Glucose    Cancelled


 


Calcium    Cancelled


 


Total Bilirubin    Cancelled


 


Direct Bilirubin    Cancelled


 


Neonat Total Bilirubin    Cancelled


 


Neonat Direct Bilirubin    Cancelled


 


Neonat Indirect Bili    Cancelled


 


AST    Cancelled


 


ALT    Cancelled


 


Alkaline Phosphatase    Cancelled


 


Total Protein    Cancelled


 


Albumin    Cancelled


 


Lipase    Cancelled


 


Urine Color  YELLOW  


 


Urine Appearance  CLOUDY  


 


Urine pH  9.0  


 


Ur Specific Gravity  1.023  


 


Urine Protein  NEGATIVE  


 


Urine Glucose (UA)  NEGATIVE  


 


Urine Ketones  NEGATIVE  


 


Urine Blood  NEGATIVE  


 


Urine Nitrite  NEGATIVE  


 


Urine Bilirubin  NEGATIVE  


 


Urine Urobilinogen  NEGATIVE  


 


Ur Leukocyte Esterase  NEGATIVE  


 


Urine WBC (Auto)  2  


 


Squamous Epi Cells Auto  1  


 


Amorphous Sediment Auto  TRACE  


 


Urine Mucus (Auto)  RARE  


 


Urine Ascorbic Acid  NEGATIVE  














  19





  08:09


 


WBC 


 


RBC 


 


Hgb 


 


Hct 


 


MCV 


 


MCH 


 


MCHC 


 


RDW 


 


Plt Count 


 


Seg Neutrophils % 


 


Lymphocytes % 


 


Monocytes % 


 


Eosinophils % 


 


Basophils % 


 


Absolute Neutrophils 


 


Absolute Lymphocytes 


 


Absolute Monocytes 


 


Absolute Eosinophils 


 


Absolute Basophils 


 


Sodium  140.5


 


Potassium  4.5


 


Chloride  105


 


Carbon Dioxide  24


 


Anion Gap  12


 


BUN  11


 


Creatinine  0.52


 


Est GFR ( Amer)  > 60


 


Est GFR (Non-Af Amer)  > 60


 


Glucose  131 H


 


Calcium  9.5


 


Total Bilirubin  0.9


 


Direct Bilirubin  0.4


 


Neonat Total Bilirubin  Not Reportable


 


Neonat Direct Bilirubin  Not Reportable


 


Neonat Indirect Bili  Not Reportable


 


AST  24


 


ALT  23


 


Alkaline Phosphatase  72


 


Total Protein  7.9


 


Albumin  4.7


 


Lipase  77.3


 


Urine Color 


 


Urine Appearance 


 


Urine pH 


 


Ur Specific Gravity 


 


Urine Protein 


 


Urine Glucose (UA) 


 


Urine Ketones 


 


Urine Blood 


 


Urine Nitrite 


 


Urine Bilirubin 


 


Urine Urobilinogen 


 


Ur Leukocyte Esterase 


 


Urine WBC (Auto) 


 


Squamous Epi Cells Auto 


 


Amorphous Sediment Auto 


 


Urine Mucus (Auto) 


 


Urine Ascorbic Acid 











                                        





Abdomen/Pelvis CT  19 06:33


IMPRESSION:  1. There is a very large, multicomponent ventral hernia of the low 

midline abdomen, which measures at least 33.0 x 9.5 x 18.5 cm, containing the 

majority of the distal small bowel and right colon.  Loops of small bowel are 

mildly distended measuring up to 3.2 cm in caliber.  Appearance and 

configuration are not generally changed compared to recent prior examination 

dated 2019, however there is concern for at least partial obstruction given 

the appearance, numerous loops involved, and relative distal decompression of 

the descending colon.


2.  Status post sleeve gastrectomy.


 











19 14:04


Patient was just evaluated by Dr. Diaz who has requested a small bowel series

with Gastrografin.  He feels if she has a complete obstruction she is requiring 

transfer to a tertiary center.  He also feels the Gastrografin may alleviate the

partial small bowel obstruction currently present.  Patient's care will be 

signed out to Dr. Moore for continued follow-up on the abdominal series. 

(TODD OLSON)





19 17:53


Patient was seen and reevaluated.  Small bowel series failed to show any 

significant obstruction.  In fact, following the Gastrografin, the patient was 

having multiple bowel movements.  She was reevaluated by Dr. Diaz believe 

that outpatient follow-up was appropriate.  The patient was amenable to this.  I

will get and send her home with nausea medication.  She is to follow-up with 

surgery, return to the ED with worsening or new concerning symptoms. (JAKE GEORGES)





- Vital Signs


Vital signs: 





                                        











Temp Pulse Resp BP Pulse Ox


 


 97.7 F   75   17   129/58 H  95 


 


 19 11:11  19 11:11  19 14:01  19 14:01  19 14:01














- Laboratory


Laboratory results interpreted by me: 





                                        











  19





  06:35 08:09


 


WBC  11.1 H 


 


Seg Neutrophils %  86.0 H 


 


Lymphocytes %  9.5 L 


 


Monocytes %  2.7 L 


 


Absolute Neutrophils  9.6 H 


 


Glucose   131 H














Discharge





<TODD OLSON - Last Filed: 19 14:25>





<JAKE MOORE - Last Filed: 19 17:55>





- Discharge


Clinical Impression: 


 Partial small bowel obstruction





Ventral hernia


Qualifiers:


 Obstruction and gangrene presence: with obstruction but without gangrene 

Qualified Code(s): K43.6 - Other and unspecified ventral hernia with 

obstruction, without gangrene





Condition: Stable


Disposition: HOME, SELF-CARE


Instructions:  Bowel Obstruction ([facility])


Additional Instructions: 


Your initial scan showed a partial small bowel obstruction, but this seems to 

have improved after the Gastrografin small bowel follow-through.  Take Zofran as

needed for nausea.  Follow-up with surgery in 1 to 2 weeks.  Return to the ED 

with worsening or new concerning symptoms of  any sort.


Referrals: 


KIKI REYNOLDS MD [Primary Care Provider] - Follow up as needed


JN DIAZ MD [[facility]] - Follow up as needed

## 2019-07-02 NOTE — PDOC CONSULTATION
Consultation


Consult Date: 19


Provider Consulted: JN DIAZ


Consult reason:: Partial SBO on CT scan





History of Present Illness


Admission Date/PCP: 


  





  KIKI REYNOLDS MD





History of Present Illness: 


YESSENIA TREVINO is a 43 year old female with history of incisional hernia c/o 

pains along hernia at [facility] incisional hernia today. Associated with nausea. She 

had 2 ceasarean sections one 12 yrs ago and 2nd one 10 years ago. During @nd CS 

an attemt to repair an incisional hernia from the previous CS was done but only 

to recur right away. Had at least 2 other episode of partial SBO which resolved 

under medical max.


I ordered a small bowel follow through with gastrograffin which was normal 

without obstruction.


She had a sleeve resection at [facility] in preparation for eventual repair of inci

sional hernia after weight loss. However, she only lost about 30 lbs. 








Past Medical History


Cardiac Medical History: Reports: Hypertension - no current meds


   Denies: Coronary Artery Disease, Myocardial Infarction


Pulmonary Medical History: Reports: Bronchitis -  hx of, Pneumonia - hx of


   Denies: Asthma, Chronic Obstructive Pulmonary Disease (COPD)


Neurological Medical History: 


   Denies: Seizures


Endocrine Medical History: Reports: Diabetes Mellitus Type 2 - "borderline" per 

pt


GI Medical History: Reports: Gastroesophageal Reflux Disease


Musculoskeltal Medical History: 


   Denies: Arthritis


Psychiatric Medical History: Reports: Depression - anxiety


Hematology: 


   Denies: Anemia





Past Surgical History


Past Surgical History: Reports:  Section - x2, Tonsillectomy, Tubal 

Ligation, Other - Gastric sleeve surgery





Social History


Smoking Status: Former Smoker


Frequency of Alcohol Use: Rare


Hx Recreational Drug Use: No


Hx Prescription Drug Abuse: No





Family History


Family History: CAD, DM, Hypertension, Thyroid Disfunction


Parental Family History Reviewed: Yes


Children Family History Reviewed: No


Sibling(s) Family History Reviewed.: No





Medication/Allergy


Home Medications: 








Calcium Carbonate [Tums Chewable 500 mg Tab.chew] 500 mg PO TIDP PRN 19 


Omeprazole 20 mg PO DAILY 19 


Ondansetron [Zofran Odt 4 mg Tablet] 1 tab PO Q4H PRN #15 tab.rapdis 19 








Allergies/Adverse Reactions: 


                                        





chlorpheniramine [From Omayra TITUS] Allergy (Severe, Verified 19 06:31)


   headache, n and v


dextromethorphan tannate [From [employer] ] Allergy (Severe, Verified 19 

06:31)


   headache, n and v


pseudoephedrine tannate [From [employer] ] Allergy (Severe, Verified 19 

06:31)


   headache, n and v


NSAIDS (Non-Steroidal Anti-Inflamma Adverse Reaction (Verified 19 07:39)


   











Review of Systems


Constitutional: PRESENT: as per HPI





Physical Exam


Vital Signs: 


                                        











Temp Pulse Resp BP Pulse Ox


 


 98.0 F   75   17   144/79 H  92 


 


 19 18:03  19 11:11  19 17:19  19 18:03  19 17:19








                                 Intake & Output











 19





 06:59 06:59 06:59


 


Weight  129.8 kg 














Results


Laboratory Results: 


                                        





                                 19 06:35 





                                 19 08:09 





                                        











  19





  06:25 06:35 06:35


 


WBC   11.1 H 


 


RBC   5.01 


 


Hgb   14.0 


 


Hct   41.8 


 


MCV   84 


 


MCH   28.0 


 


MCHC   33.5 


 


RDW   13.4 


 


Plt Count   279 


 


Seg Neutrophils %   86.0 H 


 


Lymphocytes %   9.5 L 


 


Monocytes %   2.7 L 


 


Eosinophils %   1.1 


 


Basophils %   0.7 


 


Absolute Neutrophils   9.6 H 


 


Absolute Lymphocytes   1.1 


 


Absolute Monocytes   0.3 


 


Absolute Eosinophils   0.1 


 


Absolute Basophils   0.1 


 


Sodium    Cancelled


 


Potassium    Cancelled


 


Chloride    Cancelled


 


Carbon Dioxide    Cancelled


 


Anion Gap    Cancelled


 


BUN    Cancelled


 


Creatinine    Cancelled


 


Est GFR ( Amer)    Cancelled


 


Est GFR (Non-Af Amer)    Cancelled


 


Glucose    Cancelled


 


Calcium    Cancelled


 


Total Bilirubin    Cancelled


 


AST    Cancelled


 


ALT    Cancelled


 


Alkaline Phosphatase    Cancelled


 


Total Protein    Cancelled


 


Albumin    Cancelled


 


Lipase    Cancelled


 


Urine Color  YELLOW  


 


Urine Appearance  CLOUDY  


 


Urine pH  9.0  


 


Ur Specific Gravity  1.023  


 


Urine Protein  NEGATIVE  


 


Urine Glucose (UA)  NEGATIVE  


 


Urine Ketones  NEGATIVE  


 


Urine Blood  NEGATIVE  


 


Urine Nitrite  NEGATIVE  


 


Ur Leukocyte Esterase  NEGATIVE  


 


Urine WBC (Auto)  2  














  19





  08:09


 


WBC 


 


RBC 


 


Hgb 


 


Hct 


 


MCV 


 


MCH 


 


MCHC 


 


RDW 


 


Plt Count 


 


Seg Neutrophils % 


 


Lymphocytes % 


 


Monocytes % 


 


Eosinophils % 


 


Basophils % 


 


Absolute Neutrophils 


 


Absolute Lymphocytes 


 


Absolute Monocytes 


 


Absolute Eosinophils 


 


Absolute Basophils 


 


Sodium  140.5


 


Potassium  4.5


 


Chloride  105


 


Carbon Dioxide  24


 


Anion Gap  12


 


BUN  11


 


Creatinine  0.52


 


Est GFR ( Amer)  > 60


 


Est GFR (Non-Af Amer)  > 60


 


Glucose  131 H


 


Calcium  9.5


 


Total Bilirubin  0.9


 


AST  24


 


ALT  23


 


Alkaline Phosphatase  72


 


Total Protein  7.9


 


Albumin  4.7


 


Lipase  77.3


 


Urine Color 


 


Urine Appearance 


 


Urine pH 


 


Ur Specific Gravity 


 


Urine Protein 


 


Urine Glucose (UA) 


 


Urine Ketones 


 


Urine Blood 


 


Urine Nitrite 


 


Ur Leukocyte Esterase 


 


Urine WBC (Auto) 











Impressions: 


                                        





Abdomen/Pelvis CT  19 06:33


IMPRESSION:  1. There is a very large, multicomponent ventral hernia of the low 

midline abdomen, which measures at least 33.0 x 9.5 x 18.5 cm, containing the 

majority of the distal small bowel and right colon.  Loops of small bowel are 

mildly distended measuring up to 3.2 cm in caliber.  Appearance and 

configuration are not generally changed compared to recent prior examination 

dated 2019, however there is concern for at least partial obstruction given 

the appearance, numerous loops involved, and relative distal decompression of 

the descending colon.


2.  Status post sleeve gastrectomy.


 








Small Bowel X-Ray  19 14:02


IMPRESSION:  Large lower anterior abdominal wall ventral hernia containing the 

distal small bowel, ileocecal valve, appendix, ascending colon.  These bowel 

loops are nonobstructed.


 














Assessment & Plan





- Diagnosis


(1) Morbid obesity


Is this a current diagnosis for this admission?: Yes   





(2) Partial small bowel obstruction


Is this a current diagnosis for this admission?: Yes   





(3) Ventral hernia


Qualifiers: 


   Obstruction and gangrene presence: with obstruction but without gangrene   

Qualified Code(s): K43.6 - Other and unspecified ventral hernia with 

obstruction, without gangrene   


Is this a current diagnosis for this admission?: Yes   





- Time


Time Spent: 30 to 50 Minutes





- Plan Summary


Plan Summary: 





Patient felt better post SBFT with BM. Was concerned about her constipation. She

was re-assured that there is no SBO at this time


She should follow up with [facility] . May need further weight loss procedure prior 

to eventual repair of this large incisional/ventral hernia.

## 2019-07-02 NOTE — RADIOLOGY REPORT (SQ)
EXAM DESCRIPTION:  SMALL BOWEL SERIES



COMPLETED DATE/TIME:  7/2/2019 4:19 pm



REASON FOR STUDY:  small bowel obstruction



COMPARISON:  CT abdomen pelvis 7/2/2019, 6/9/2019, 11/24/2014



FLUOROSCOPY TIME:  Less than 5 seconds

14 digital radiographic images saved to PACS.



LIMITATIONS:  None.



PROCEDURE:  Initial  image of abdomen acquired, followed by administration of 500 mL of half str
ength Gastrografin.  Serial radiographic images acquired.  Fluoroscopic images recorded of the termin
al ileum and other indicated areas.  All images stored on PACS.



FINDINGS:   film demonstrates IV contrast in the urinary bladder and nondilated renal and ureter
al collecting systems from prior CT scan earlier today.  Nonobstructive bowel gas pattern.

Patient drank 500 mL of half strength Gastrografin.

There is prompt gastric emptying into a normal duodenum and jejunum.

Patient has a large ventral hernia in the suprapubic region.  The ileum, ileocecal valve, right colon
 and appendix are in the hernia, unobstructed.

Oral contrast fills the descending colon on the 1 hour films.

This report was called to Dr. Friedman in the OR



IMPRESSION:  Large lower anterior abdominal wall ventral hernia containing the distal small bowel, il
eocecal valve, appendix, ascending colon.  These bowel loops are nonobstructed.



COMMENT:  Quality :  Final reports for procedures using fluoroscopy that document radiation exp
osure indices, or exposure time and number of fluorographic images (if radiation exposure indices are
 not available)



TECHNICAL DOCUMENTATION:  JOB ID:  [id number]

 2011 [employer]- All Rights Reserved



Reading location - IP/workstation name: [email or web address]

## 2019-07-02 NOTE — RADIOLOGY REPORT (SQ)
EXAM DESCRIPTION:  CT ABD/PELVIS WITH IV ONLY



COMPLETED DATE/TIME:  7/2/2019 9:43 am



REASON FOR STUDY:  abdominal pain, ventral hernia



COMPARISON:  6/9/2019



TECHNIQUE:  CT scan of the abdomen and pelvis performed using helical scanning technique with dynamic
 intravenous contrast injection.  No oral contrast. Images reviewed with lung, soft tissue, and bone 
windows. Reconstructed coronal and sagittal MPR images reviewed. Delayed images for evaluation of the
 urinary system also acquired. All images stored on PACS.

All CT scanners at this facility use dose modulation, iterative reconstruction, and/or weight based d
osing when appropriate to reduce radiation dose to as low as reasonably achievable (ALARA).

[facility]: Dose Right  [facility]: CareDose    [facility]: Dose Right    [facility]: Teradose 4D    [facility]: Smart Technologies



CONTRAST TYPE AND DOSE:  contrast/concentration: Isovue 350.00 mg/ml; Total Contrast Delivered: 100.0
 ml; Total Saline Delivered: 45.0 ml



RENAL FUNCTION:  None required. The patient is less than 50 years old.



RADIATION DOSE:  CT Rad equipment meets quality standard of care and radiation dose reduction techniq
ues were employed. CTDIvol: 21.1 - 21.1 mGy. DLP: 2404 mGy-cm..



LIMITATIONS:  None.



FINDINGS:  LOWER CHEST: No significant findings. No nodules or infiltrates.

LIVER: Normal size. No masses.  No dilated ducts.

SPLEEN: Normal size. No focal lesions.

PANCREAS: No masses. No significant calcifications. No adjacent inflammation or peripancreatic fluid 
collections. Pancreatic duct not dilated.

GALLBLADDER: No identified stones by CT criteria. No inflammatory changes to suggest cholecystitis.

ADRENAL GLANDS: No significant masses or asymmetry.

RIGHT KIDNEY AND URETER: No solid masses.   No significant calcifications.   No hydronephrosis or hyd
roureter.

LEFT KIDNEY AND URETER: No solid masses.   No significant calcifications.   No hydronephrosis or hydr
oureter.

AORTA AND VESSELS: No aneurysm. No dissection. Renal arteries, SMA, celiac without stenosis.

RETROPERITONEUM: No retroperitoneal adenopathy, hemorrhage or masses.

BOWEL AND PERITONEAL CAVITY: Status post sleeve gastrectomy.  No masses or inflammatory changes. No f
ree fluid or peritoneal masses.

APPENDIX: Normal.

PELVIS: No mass.  No free fluid. Normal bladder.

ABDOMINAL WALL: There is a very large, multicomponent ventral hernia of the low midline abdomen, whic
h measures at least 33.0 x 9.5 x 18.5 cm, containing the majority of the distal small bowel and right
 colon. Loops of small bowel are mildly distended measuring up to 3.2 cm in caliber.

BONES: No significant or acute findings.

OTHER: No other significant finding.



IMPRESSION:  1. There is a very large, multicomponent ventral hernia of the low midline abdomen, whic
h measures at least 33.0 x 9.5 x 18.5 cm, containing the majority of the distal small bowel and right
 colon.  Loops of small bowel are mildly distended measuring up to 3.2 cm in caliber.  Appearance and
 configuration are not generally changed compared to recent prior examination dated 6/9/2019, however
 there is concern for at least partial obstruction given the appearance, numerous loops involved, and
 relative distal decompression of the descending colon.

2.  Status post sleeve gastrectomy.



TECHNICAL DOCUMENTATION:  JOB ID:  [id number]

Quality ID # [id number]: Final reports with documentation of one or more dose reduction techniques (e.g., Au
tomated exposure control, adjustment of the mA and/or kV according to patient size, use of iterative 
reconstruction technique)

 2011 [employer]- All Rights Reserved



Reading location - IP/workstation name: [email or web address]

## 2019-10-01 ENCOUNTER — HOSPITAL ENCOUNTER (EMERGENCY)
Dept: HOSPITAL 62 - ER | Age: 44
LOS: 1 days | Discharge: HOME | End: 2019-10-02
Payer: SELF-PAY

## 2019-10-01 DIAGNOSIS — K43.9: Primary | ICD-10-CM

## 2019-10-01 DIAGNOSIS — Z88.8: ICD-10-CM

## 2019-10-01 DIAGNOSIS — E66.01: ICD-10-CM

## 2019-10-01 DIAGNOSIS — Z98.84: ICD-10-CM

## 2019-10-01 DIAGNOSIS — I10: ICD-10-CM

## 2019-10-01 DIAGNOSIS — R11.2: ICD-10-CM

## 2019-10-01 LAB
ADD MANUAL DIFF: NO
ALBUMIN SERPL-MCNC: 4.8 G/DL (ref 3.5–5)
ALP SERPL-CCNC: 86 U/L (ref 38–126)
ANION GAP SERPL CALC-SCNC: 9 MMOL/L (ref 5–19)
APPEARANCE UR: (no result)
APTT PPP: (no result) S
AST SERPL-CCNC: 22 U/L (ref 14–36)
BASOPHILS # BLD AUTO: 0 10^3/UL (ref 0–0.2)
BASOPHILS NFR BLD AUTO: 0.4 % (ref 0–2)
BILIRUB DIRECT SERPL-MCNC: 0.1 MG/DL (ref 0–0.4)
BILIRUB SERPL-MCNC: 1 MG/DL (ref 0.2–1.3)
BILIRUB UR QL STRIP: NEGATIVE
BUN SERPL-MCNC: 10 MG/DL (ref 7–20)
CALCIUM: 10.1 MG/DL (ref 8.4–10.2)
CHLORIDE SERPL-SCNC: 99 MMOL/L (ref 98–107)
CO2 SERPL-SCNC: 29 MMOL/L (ref 22–30)
EOSINOPHIL # BLD AUTO: 0.1 10^3/UL (ref 0–0.6)
EOSINOPHIL NFR BLD AUTO: 0.9 % (ref 0–6)
ERYTHROCYTE [DISTWIDTH] IN BLOOD BY AUTOMATED COUNT: 13.2 % (ref 11.5–14)
GLUCOSE SERPL-MCNC: 129 MG/DL (ref 75–110)
GLUCOSE UR STRIP-MCNC: NEGATIVE MG/DL
HCT VFR BLD CALC: 42.6 % (ref 36–47)
HGB BLD-MCNC: 14.3 G/DL (ref 12–15.5)
KETONES UR STRIP-MCNC: 80 MG/DL
LYMPHOCYTES # BLD AUTO: 1.4 10^3/UL (ref 0.5–4.7)
LYMPHOCYTES NFR BLD AUTO: 12 % (ref 13–45)
MCH RBC QN AUTO: 28 PG (ref 27–33.4)
MCHC RBC AUTO-ENTMCNC: 33.5 G/DL (ref 32–36)
MCV RBC AUTO: 84 FL (ref 80–97)
MONOCYTES # BLD AUTO: 0.6 10^3/UL (ref 0.1–1.4)
MONOCYTES NFR BLD AUTO: 5.6 % (ref 3–13)
NEUTROPHILS # BLD AUTO: 9.2 10^3/UL (ref 1.7–8.2)
NEUTS SEG NFR BLD AUTO: 81.1 % (ref 42–78)
NITRITE UR QL STRIP: NEGATIVE
PH UR STRIP: 5 [PH] (ref 5–9)
PLATELET # BLD: 287 10^3/UL (ref 150–450)
POTASSIUM SERPL-SCNC: 4.3 MMOL/L (ref 3.6–5)
PROT SERPL-MCNC: 8.3 G/DL (ref 6.3–8.2)
PROT UR STRIP-MCNC: 100 MG/DL
RBC # BLD AUTO: 5.1 10^6/UL (ref 3.72–5.28)
SP GR UR STRIP: 1.02
TOTAL CELLS COUNTED % (AUTO): 100 %
UROBILINOGEN UR-MCNC: NEGATIVE MG/DL (ref ?–2)
WBC # BLD AUTO: 11.3 10^3/UL (ref 4–10.5)

## 2019-10-01 PROCEDURE — 74177 CT ABD & PELVIS W/CONTRAST: CPT

## 2019-10-01 PROCEDURE — 81001 URINALYSIS AUTO W/SCOPE: CPT

## 2019-10-01 PROCEDURE — 74022 RADEX COMPL AQT ABD SERIES: CPT

## 2019-10-01 PROCEDURE — 36415 COLL VENOUS BLD VENIPUNCTURE: CPT

## 2019-10-01 PROCEDURE — 85025 COMPLETE CBC W/AUTO DIFF WBC: CPT

## 2019-10-01 PROCEDURE — 80053 COMPREHEN METABOLIC PANEL: CPT

## 2019-10-01 PROCEDURE — S0119 ONDANSETRON 4 MG: HCPCS

## 2019-10-01 NOTE — ER DOCUMENT REPORT
ED GI/





- General


Chief Complaint: Abdominal Pain


Stated Complaint: HERNIA PAIN,VOMITING


Time Seen by Provider: 10/01/19 20:59


Primary Care Provider: 


KIKI REYNOLDS MD [ACTIVE STAFF] - Follow up as needed


Mode of Arrival: Ambulatory


Information source: Patient


Notes: 





HISTORY OF PRESENT ILLNESS:





Patient is a 44-year-old morbidly obese female with a past medical history of a 

large ventral hernia who presents with pain and tenderness in the lower abdomen 

around her hernia site.  Patient reports that she has been seen by multiple 

providers and surgeons, has been told that she is not a candidate for surgery 

until she loses weight.  Patient is currently status post gastric sleeve with 

minimal results.  She denies constipation or diarrhea, reports having a normal 

bowel movement today, did have one episode of nonbloody and nonbilious emesis 

earlier as she says "was from the pain."





Location: Lower abdomen


Onset: Several days ago


Alleviation: None


Provocation: Movement


Quality: Aching


Radiation: None


Severity: Moderate


Timing: Constant


History of abdominal surgery: Yes,  x2


Associated symptoms: Nausea, denies constipation or diarrhea


Last bowel movement: Today and normal











REVIEW OF SYSTEMS:





CONSTITUTIONAL : Denies fever or chills, no sweats.  Denies recent illness.


EENT:   Denies eye, ear, throat, or mouth pain or symptoms.  Denies nasal or 

sinus congestion.


CARDIOVASCULAR: Denies chest pain.  Denies swelling of the legs.


RESPIRATORY: Denies cough, cold, or chest congestion.  Denies shortness of 

breath or difficulty breathing.  Denies wheezing.


GASTROINTESTINAL: Positive for abdominal pain.  Positive for nausea and vomiting

but no diarrhea or constipation. 


GENITOURINARY: Denies difficulty urinating, painful urination, burning, fr

equency, or blood in urine.


FEMALE  GENITOURINARY:  Denies vaginal bleeding, abnormal or irregular periods.


MUSCULOSKELETAL:  Denies neck or back pain or joint pain or swelling.


SKIN:   Denies rash or skin lesions.


HEMATOLOGIC :   Denies easy bruising or bleeding.


LYMPHATIC:  Denies swollen, enlarged glands.


NEUROLOGICAL:  Denies altered mental status or loss of consciousness.  Denies 

headache.  Denies weakness or paralysis or loss of use of either side.  Denies 

problems with gait or speech.  Denies sensory or motor loss.


PSYCHIATRIC:  Denies anxiety or stress or depression.





All other systems reviewed and negative.











PHYSICAL EXAMINATION:





GENERAL: Morbidly obese but well-appearing, well-nourished and in no acute 

distress.


HEAD: Atraumatic, normocephalic. No scalp deformity, depression, or crepitance.


EYES: Pupils are 3 mm and equal/round/reactive to light, extraocular movements 

intact, sclera anicteric, conjunctiva are normal.


ENT: Nares patent bilaterally, oropharynx.  Moist mucous membranes. No tonsil 

hypertrophy.


NECK: Normal range of motion, supple without lymphadenopathy.


LUNGS: Breath sounds present, equal, and clear to auscultation bilaterally.  No 

wheezes, rales, or rhonchi.


HEART: Regular rate and rhythm without murmurs, rubs, or gallops.  2+ peripheral

pulses.  Normal capillary refill.


ABDOMEN: Extremely large ventral hernia across the lower abdomen that is tender 

and partially reducible.  Soft, nondistended. Normoactive bowel sounds.  No 

guarding, no rebound.  No masses appreciated.


BACK: Normal contour, no midline tenderness. Rectal exam deferred.


GENITAL/PELVIC: Deferred.


EXTREMITIES: Normal range of motion, no pitting or edema.  No cyanosis.


NEUROLOGICAL: No focal neurological deficits. Moves all extremities 

spontaneously and on command.


PSYCH: Normal mood, normal affect.  No suicidal thoughts/ideations.  No 

homicidal thoughts/ideations.  No hallucinations.


SKIN: Warm, dry, normal turgor, no rashes or lesions noted.











ASSESSMENT AND PLAN:





This patient is a 44-year-old female who presents with diffuse lower abdominal 

tenderness surrounding a large ventral hernia.  Concern for incarceration versus

strangulation.





1. Will obtain labs, urine, and CT scan of the abdomen/pelvis.


2. Will give IV morphine for pain control.


TRAVEL OUTSIDE OF THE [place] IN LAST 30 DAYS: No





- HPI


Patient complains to provider of: Abdominal pain


Onset: Last week


Timing/Duration: Gradual


Quality of pain: Achy, Burning


Severity at maximum: Severe


Severity in ED: Moderate


Pain Level: 3


Location: Other - Lower abdomen


Vaginal bleeding (Compared to normal period): None


Sexual history: Inactive


Associated symptoms: None


Exacerbated by: Movement


Relieved by: Denies


Similar symptoms previously: No


Recently seen / treated by doctor: No





- Related Data


Allergies/Adverse Reactions: 


                                        





chlorpheniramine [From [employer]ITUS] Allergy (Severe, Verified 19 06:31)


   headache, n and v


dextromethorphan tannate [From [employer]] Allergy (Severe, Verified 19 

06:31)


   headache, n and v


pseudoephedrine tannate [From "[employer] DS] Allergy (Severe, Verified 19 

06:31)


   headache, n and v


NSAIDS (Non-Steroidal Anti-Inflamma Adverse Reaction (Verified 19 07:39)


   











Past Medical History





- General


Information source: Patient





- Social History


Smoking Status: Never Smoker


Chew tobacco use (# tins/day): No


Frequency of alcohol use: None


Drug Abuse: None


Lives with: Alone


Family History: CAD, DM, Hypertension, Thyroid Disfunction


Patient has suicidal ideation: No


Patient has homicidal ideation: No





- Past Medical History


Cardiac Medical History: Reports: Hx Hypertension - no current meds


   Denies: Hx Coronary Artery Disease, Hx Heart Attack


Pulmonary Medical History: Reports: Hx Bronchitis -  hx of, Hx Pneumonia - hx of


   Denies: Hx Asthma, Hx COPD


EENT Medical History: Reports: None


Neurological Medical History: Reports: None.  Denies: Hx Cerebrovascular 

Accident, Hx Seizures


Endocrine Medical History: Reports: Hx Diabetes Mellitus Type 2 - "borderline" 

per pt


Renal/ Medical History: Reports: None.  Denies: Hx Peritoneal Dialysis


Malignancy Medical History: Reports: None


GI Medical History: Reports: Hx Gastroesophageal Reflux Disease


Musculoskeletal Medical History: Reports None, Denies Hx Arthritis


Skin Medical History: Reports None


Psychiatric Medical History: Reports: Hx Depression - anxiety


Traumatic Medical History: Reports: None


Infectious Medical History: Reports: None


Past Surgical History: Reports: Hx  Section - x2, Hx Gynecologic Surgery

- novasure, vag repair, Hx Tonsillectomy, Hx Tubal Ligation, Other - Gastric 

sleeve surgery





- Immunizations


Immunizations up to date: No


Hx Diphtheria, Pertussis, Tetanus Vaccination: No





Review of Systems





- Review of Systems


Constitutional: No symptoms reported


EENT: No symptoms reported


Cardiovascular: No symptoms reported


Respiratory: No symptoms reported


Gastrointestinal: See HPI, Abdominal pain, Nausea, Vomiting


Genitourinary: No symptoms reported


Female Genitourinary: No symptoms reported


Musculoskeletal: No symptoms reported


Skin: No symptoms reported


Hematologic/Lymphatic: No symptoms reported


Neurological/Psychological: No symptoms reported


-: Yes All other systems reviewed and negative





Physical Exam





- Vital signs


Vitals: 


                                        











Temp Pulse Resp BP Pulse Ox


 


 97.9 F   76   18   170/79 H  100 


 


 10/01/19 20:19  10/01/19 20:19  10/01/19 20:19  10/01/19 20:19  10/01/19 20:19











Interpretation: Normal





Course





- Re-evaluation


Re-evalutation: 





10/02/19 01:16


CT scan shows no evidence of intra-abdominal pathology, large ventral hernia 

containing the cecum that is free moving without evidence of strain relation or 

incarceration.  Will discharge the patient home with strict return precautions 

and follow-up with primary care. All results were explained to and discussed 

with the patient, and all questions addressed and answered. The patient voices 

both understanding and agreeing with the plan.





- Vital Signs


Vital signs: 


                                        











Temp Pulse Resp BP Pulse Ox


 


 97.9 F   76   18   170/79 H  100 


 


 10/01/19 20:19  10/01/19 20:19  10/01/19 20:19  10/01/19 20:19  10/01/19 20:19














- Laboratory


Result Diagrams: 


                                 10/01/19 20:45





                                 10/01/19 20:45


Laboratory results interpreted by me: 


                                        











  10/01/19 10/01/19 10/01/19





  20:45 20:45 20:45


 


WBC  11.3 H  


 


Lymph % (Auto)  12.0 L  


 


Absolute Neuts (auto)  9.2 H  


 


Seg Neutrophils %  81.1 H  


 


Glucose   129 H 


 


Total Protein   8.3 H 


 


Urine Protein    100 H


 


Urine Ketones    80 H


 


Urine Blood    SMALL H


 


Ur Leukocyte Esterase    LARGE H














- Diagnostic Test


Radiology reviewed: Image reviewed, Reports reviewed





Discharge





- Discharge


Clinical Impression: 


Ventral hernia


Qualifiers:


 Obstruction and gangrene presence: without obstruction or gangrene Qualified 

Code(s): K43.9 - Ventral hernia without obstruction or gangrene





Condition: Good


Disposition: HOME, SELF-CARE


Instructions:  Abdominal Pain ([facility])


Additional Instructions: 


You have been evaluated in the Emergency Department for abdominal pain related 

to your hernia.  While here, you had a CT scan that was normal and it is now 

safe to be discharged home.  Please follow-up with your primary physician as 

instructed in one week to be rechecked. Return to the Emergency Department if 

you experience worsening pain, the inability to have a bowel movement, 

uncontrollable vomiting, or any other concerning symptoms.


Prescriptions: 


Tramadol HCl [Ultram] 50 mg PO Q6HP PRN #28 tablet


 PRN Reason: For Pain


Ondansetron [Zofran Odt 4 mg Tablet] 1 tab PO Q8HP PRN #30 tab.rapdis


 PRN Reason: For Nausea/Vomiting


Referrals: 


KIKI REYNOLDS MD [ACTIVE STAFF] - Follow up as needed


Print Language: English

## 2019-10-01 NOTE — RADIOLOGY REPORT (SQ)
XR ABDOMEN SUPINE AND ERECT WITH CHEST (ABD ACUTE SERIES) 



CLINICAL STATEMENT: Large ventral hernia increase in pain nv



COMPARISON:  6/9/2019



FINDINGS:  Cardiomediastinal silhouette is within normal limits.

There is no focal lung consolidation or pleural effusion. No

evidence of pulmonary edema or pneumothorax.



IMPRESSION:



No acute cardiopulmonary disease.

## 2019-10-01 NOTE — ER DOCUMENT REPORT
ED Medical Screen (RME)





- General


Chief Complaint: Abdominal Problem


Stated Complaint: HERNIA PAIN,VOMITING


Time Seen by Provider: 10/01/19 20:24


Primary Care Provider: 


KIKI REYNOLDS MD [Primary Care Provider] - Follow up as needed


Mode of Arrival: Ambulatory


Information source: Patient


Notes: 





44-year-old female presents to ED for extremely large ventral hernia.  She 

states she has had this hernia for about 10 years and she has been told it could

not be operated on her it would just come right back.  She states it is been 

hurting for a long time but over the last month or so it is gotten progressively

worse with worse nausea and vomiting and pain.  She says each episode the pain 

stays worse.  Patient is alert oriented respirations regular and unlabored s

peaking in full sentences.














I have greeted and performed a rapid initial assessment of this patient.  A 

comprehensive ED assessment and evaluation of the patient, analysis of test 

results and completion of medical decision making process will be conducted by 

an additional ED providers.


TRAVEL OUTSIDE OF THE [place] IN LAST 30 DAYS: No





- Related Data


Allergies/Adverse Reactions: 


                                        





chlorpheniramine [From [employer] DS] Allergy (Severe, Verified 19 06:31)


   headache, n and v


dextromethorphan tannate [From [employer] [facility]] Allergy (Severe, Verified 19 

06:31)


   headache, n and v


pseudoephedrine tannate [From [employer] [facility]] Allergy (Severe, Verified 19 

06:31)


   headache, n and v


NSAIDS (Non-Steroidal Anti-Inflamma Adverse Reaction (Verified 19 07:39)


   











Past Medical History





- Past Medical History


Cardiac Medical History: Reports: Hx Hypertension - no current meds


   Denies: Hx Coronary Artery Disease, Hx Heart Attack


Pulmonary Medical History: Reports: Hx Bronchitis -  hx of, Hx Pneumonia - hx of


   Denies: Hx Asthma, Hx COPD


Neurological Medical History: Denies: Hx Cerebrovascular Accident, Hx Seizures


Endocrine Medical History: Reports: Hx Diabetes Mellitus Type 2 - "borderline" 

per pt


Renal/ Medical History: Denies: Hx Peritoneal Dialysis


GI Medical History: Reports: Hx Gastroesophageal Reflux Disease


Musculoskeltal Medical History: Denies Hx Arthritis


Psychiatric Medical History: Reports: Hx Depression - anxiety


Past Surgical History: Reports: Hx  Section - x2, Hx Gynecologic Surgery

- novasure, vag repair, Hx Tonsillectomy, Hx Tubal Ligation, Other - Gastric 

sleeve surgery





- Immunizations


Immunizations up to date: No


Hx Diphtheria, Pertussis, Tetanus Vaccination: No





Physical Exam





- Vital signs


Vitals: 





                                        











Temp Pulse Resp BP Pulse Ox


 


 97.9 F   76   18   170/79 H  100 


 


 10/01/19 20:19  10/01/19 20:19  10/01/19 20:19  10/01/19 20:19  10/01/19 20:19














Course





- Vital Signs


Vital signs: 





                                        











Temp Pulse Resp BP Pulse Ox


 


 97.9 F   76   18   170/79 H  100 


 


 10/01/19 20:19  10/01/19 20:19  10/01/19 20:19  10/01/19 20:19  10/01/19 20:19














Doctor's Discharge





- Discharge


Referrals: 


KIKI REYNOLDS MD [Primary Care Provider] - Follow up as needed

## 2019-10-02 VITALS — SYSTOLIC BLOOD PRESSURE: 125 MMHG | DIASTOLIC BLOOD PRESSURE: 64 MMHG

## 2019-10-02 NOTE — RADIOLOGY REPORT (SQ)
CLINICAL HISTORY:  LOWER Abdominal pain 



COMPARISON: None.



TECHNIQUE: CT ABDOMEN PELVIS WITH IV CONTRAST on 10/2/2019 12:00

AM CDT



This exam was performed according to our departmental

dose-optimization program, which includes automated exposure

control, adjustment of the mA and/or kV according to patient size

and/or use of iterative reconstruction technique.



FINDINGS: 



Lower lungs are clear.



Abdomen: The liver is normal in appearance. There is no biliary

dilatation. There are postoperative changes of the upper stomach.

Gallbladder is normal in appearance. The pancreas and spleen are

normal in appearance. The adrenal glands and kidneys are

unremarkable.



Abdominal aorta is normal in course and caliber without aneurysm.

There is no free air. There is no retroperitoneal adenopathy.



Pelvis: The cecum is mobile and extends into the left lower

quadrant ventral hernia. Urinary bladder is unremarkable. There

is no free fluid. Uterus is normal in size. Appendix is normal,

also in the left lower quadrant. There is a large multilobulated

internal hernia within the pannus.



Skeleton: There are no acute osseous findings. No suspicious bony

lesions.



IMPRESSION: 



No definite acute inflammatory process. Mobile cecum located in

the left lower quadrant.

## 2019-11-25 ENCOUNTER — HOSPITAL ENCOUNTER (EMERGENCY)
Dept: HOSPITAL 62 - ER | Age: 44
Discharge: HOME | End: 2019-11-25
Payer: MEDICAID

## 2019-11-25 VITALS — SYSTOLIC BLOOD PRESSURE: 146 MMHG | DIASTOLIC BLOOD PRESSURE: 76 MMHG

## 2019-11-25 DIAGNOSIS — R11.2: ICD-10-CM

## 2019-11-25 DIAGNOSIS — I10: ICD-10-CM

## 2019-11-25 DIAGNOSIS — Z88.8: ICD-10-CM

## 2019-11-25 DIAGNOSIS — Z87.19: ICD-10-CM

## 2019-11-25 DIAGNOSIS — R10.84: Primary | ICD-10-CM

## 2019-11-25 DIAGNOSIS — Z98.51: ICD-10-CM

## 2019-11-25 DIAGNOSIS — Z98.84: ICD-10-CM

## 2019-11-25 LAB
ADD MANUAL DIFF: NO
ALBUMIN SERPL-MCNC: 4.4 G/DL (ref 3.5–5)
ALP SERPL-CCNC: 78 U/L (ref 38–126)
ANION GAP SERPL CALC-SCNC: 10 MMOL/L (ref 5–19)
APPEARANCE UR: CLEAR
APTT PPP: YELLOW S
AST SERPL-CCNC: 21 U/L (ref 14–36)
BASOPHILS # BLD AUTO: 0 10^3/UL (ref 0–0.2)
BASOPHILS NFR BLD AUTO: 0.2 % (ref 0–2)
BILIRUB DIRECT SERPL-MCNC: 0.1 MG/DL (ref 0–0.4)
BILIRUB SERPL-MCNC: 0.7 MG/DL (ref 0.2–1.3)
BILIRUB UR QL STRIP: NEGATIVE
BUN SERPL-MCNC: 12 MG/DL (ref 7–20)
CALCIUM: 9.7 MG/DL (ref 8.4–10.2)
CHLORIDE SERPL-SCNC: 101 MMOL/L (ref 98–107)
CO2 SERPL-SCNC: 28 MMOL/L (ref 22–30)
EOSINOPHIL # BLD AUTO: 0 10^3/UL (ref 0–0.6)
EOSINOPHIL NFR BLD AUTO: 0.1 % (ref 0–6)
ERYTHROCYTE [DISTWIDTH] IN BLOOD BY AUTOMATED COUNT: 13.2 % (ref 11.5–14)
GLUCOSE SERPL-MCNC: 126 MG/DL (ref 75–110)
GLUCOSE UR STRIP-MCNC: NEGATIVE MG/DL
HCT VFR BLD CALC: 40.8 % (ref 36–47)
HGB BLD-MCNC: 13.7 G/DL (ref 12–15.5)
KETONES UR STRIP-MCNC: NEGATIVE MG/DL
LYMPHOCYTES # BLD AUTO: 0.6 10^3/UL (ref 0.5–4.7)
LYMPHOCYTES NFR BLD AUTO: 5.4 % (ref 13–45)
MCH RBC QN AUTO: 28.4 PG (ref 27–33.4)
MCHC RBC AUTO-ENTMCNC: 33.7 G/DL (ref 32–36)
MCV RBC AUTO: 85 FL (ref 80–97)
MONOCYTES # BLD AUTO: 0.6 10^3/UL (ref 0.1–1.4)
MONOCYTES NFR BLD AUTO: 5.6 % (ref 3–13)
NEUTROPHILS # BLD AUTO: 10.2 10^3/UL (ref 1.7–8.2)
NEUTS SEG NFR BLD AUTO: 88.7 % (ref 42–78)
NITRITE UR QL STRIP: NEGATIVE
PH UR STRIP: 6 [PH] (ref 5–9)
PLATELET # BLD: 287 10^3/UL (ref 150–450)
POTASSIUM SERPL-SCNC: 4.2 MMOL/L (ref 3.6–5)
PROT SERPL-MCNC: 7.7 G/DL (ref 6.3–8.2)
PROT UR STRIP-MCNC: NEGATIVE MG/DL
RBC # BLD AUTO: 4.83 10^6/UL (ref 3.72–5.28)
SP GR UR STRIP: 1.01
TOTAL CELLS COUNTED % (AUTO): 100 %
UROBILINOGEN UR-MCNC: NEGATIVE MG/DL (ref ?–2)
WBC # BLD AUTO: 11.5 10^3/UL (ref 4–10.5)

## 2019-11-25 PROCEDURE — 85025 COMPLETE CBC W/AUTO DIFF WBC: CPT

## 2019-11-25 PROCEDURE — 80053 COMPREHEN METABOLIC PANEL: CPT

## 2019-11-25 PROCEDURE — 96372 THER/PROPH/DIAG INJ SC/IM: CPT

## 2019-11-25 PROCEDURE — 83690 ASSAY OF LIPASE: CPT

## 2019-11-25 PROCEDURE — 81001 URINALYSIS AUTO W/SCOPE: CPT

## 2019-11-25 PROCEDURE — 36415 COLL VENOUS BLD VENIPUNCTURE: CPT

## 2019-11-25 PROCEDURE — 99284 EMERGENCY DEPT VISIT MOD MDM: CPT

## 2019-11-25 NOTE — ER DOCUMENT REPORT
ED General





- General


Chief Complaint: Abdominal Pain


Stated Complaint: LEFT ABDOMINAL PAIN


Time Seen by Provider: 19 08:41


Primary Care Provider: 


TOSHA DESAI MD [ACTIVE STAFF] - Follow up in 3-5 days


MAREN VILLALTA DO [Primary Care Provider] - Follow up in 3-5 days


Notes: 





44-year-old female presents with left abdominal pain at hernia site since last 

night.  Patient states this feels like her usual hernia pain. States it is 

intermittent in nature. Patient had nausea/vomiting.  Patient denies diarrhea/c

onstipation, urinary symptoms, fever, chills. Pt states she has seen surgeon in 

past who told her she needed to lose weight before surgery could be done. Pt is 

still passing flatus. Pt states her nausea is currently resolved and abdominal 

pain has improved since coming to ER.


TRAVEL OUTSIDE OF THE [place] IN LAST 30 DAYS: No





- Related Data


Allergies/Adverse Reactions: 


                                        





chlorpheniramine [From [employer]] Allergy (Severe, Verified 19 06:31)


   headache, n and v


dextromethorphan tannate [From [employer] DS] Allergy (Severe, Verified 19 

06:31)


   headache, n and v


pseudoephedrine tannate [From [employer] DS] Allergy (Severe, Verified 19 

06:31)


   headache, n and v


NSAIDS (Non-Steroidal Anti-Inflamma Adverse Reaction (Verified 19 07:39)


   








Home Medications: miralax prn.  tramadol prn





Past Medical History





- Social History


Smoking Status: Never Smoker


Frequency of alcohol use: None


Drug Abuse: None


Family History: CAD, DM, Hypertension, Thyroid Disfunction


Patient has suicidal ideation: No


Patient has homicidal ideation: No





- Past Medical History


Cardiac Medical History: Reports: Hx Hypertension - no current meds


   Denies: Hx Coronary Artery Disease, Hx Heart Attack


Pulmonary Medical History: Reports: Hx Bronchitis -  hx of, Hx Pneumonia - hx of


   Denies: Hx Asthma, Hx COPD


Neurological Medical History: Denies: Hx Cerebrovascular Accident, Hx Seizures


Endocrine Medical History: Reports: Hx Diabetes Mellitus Type 2 - "borderline" 

per pt


Renal/ Medical History: Denies: Hx Peritoneal Dialysis


GI Medical History: Reports: Hx Gastroesophageal Reflux Disease


Musculoskeletal Medical History: Denies Hx Arthritis


Psychiatric Medical History: Reports: Hx Depression - anxiety


Past Surgical History: Reports: Hx  Section - x2, Hx Gynecologic Surgery

- novasure, vag repair, Hx Tonsillectomy, Hx Tubal Ligation, Other - Gastric 

sleeve surgery





- Immunizations


Immunizations up to date: No


Hx Diphtheria, Pertussis, Tetanus Vaccination: No





Review of Systems





- Review of Systems


Notes: 





Constitutional: Negative for fever.


HENT: Negative for sore throat.


Eyes: Negative for visual changes.


Cardiovascular: Negative for chest pain.


Respiratory: Negative for shortness of breath.


Gastrointestinal: Positive for abdominal pain, nausea, vomiting. Negative for 

diarrhea.


Genitourinary: Negative for dysuria.


Musculoskeletal: Negative for back pain.


Skin: Negative for rash.


Neurological: Negative for headaches, weakness or numbness.





10 point ROS negative except as marked above and in HPI.





Physical Exam





- Vital signs


Vitals: 


                                        











Temp Pulse Resp BP Pulse Ox


 


 98.1 F   88   18   179/82 H  100 


 


 19 06:56  19 06:56  19 06:56  19 06:56  19 06:56














- Notes


Notes: 





GENERAL: Well-appearing, well-nourished and in no acute distress.


HEAD: Atraumatic, normocephalic.


EYES: Extraocular movements intact, sclera anicteric, conjunctiva are normal.


NECK: Normal range of motion, supple without lymphadenopathy or JVD.


LUNGS: Breath sounds clear to auscultation bilaterally and equal.  No wheezes 

rales or rhonchi.


HEART: Regular rate and rhythm without murmurs, rubs or gallops.


ABDOMEN: Soft, nontender, morbidly obese. No nonreducible hernia.  No guarding, 

no rebound.  No masses appreciated.


EXTREMITIES: Normal range of motion, no pitting or edema.  No clubbing or 

cyanosis.


NEUROLOGICAL: Cranial nerves II through XII grossly intact.  Normal speech, 

normal gait.


PSYCH: Normal mood, normal affect.


SKIN: Warm, Dry, normal turgor, no rashes or lesions noted.





Course





- Re-evaluation


Re-evalutation: 





19 Low suspicion/risk for acute appendicitis, bowel obstruction, 

incarcerated/strangulated hernia acute cholecystitis, acute cholangitis, 

perforated diverticulitis, incarcerated hernia, pancreatitis, perforated ulcer, 

peritonitis, sepsis, pelvic inflammatory disease, ectopic pregnancy, tubo-

ovarian abscess, ovarian torsion, or other systemic emergent condition at this 

time.  CBC, CMP, lipase, and UA ordered. Bentyl IM ordered. Pt declined anti-

emetic.





19 11:23 Pain has improved. PO challenge passed. Patient is aware that her

condition can change from initial presentation and she needs to monitor symptoms

closely and seek medical attention if any acute changes.  Conservative measures 

otherwise for symptoms.  Recheck with OBGYN in 1-2 days.  Recheck with your PCM 

in 2-3 days.    Return to the ED with any worsening/concerning symptoms 

otherwise as reviewed in discharge.  Patient is in agreement.














- Vital Signs


Vital signs: 


                                        











Temp Pulse Resp BP Pulse Ox


 


 98.1 F   88   18   179/82 H  100 


 


 19 06:56  19 06:56  19 06:56  19 06:56  19 06:56














- Laboratory


Result Diagrams: 


                                 19 08:12





                                 19 08:12


Laboratory results interpreted by me: 


                                        











  19





  08:12 08:12 08:45


 


WBC  11.5 H  


 


Lymph % (Auto)  5.4 L  


 


Absolute Neuts (auto)  10.2 H  


 


Seg Neutrophils %  88.7 H  


 


Creatinine   0.51 L 


 


Glucose   126 H 


 


Ur Leukocyte Esterase    TRACE H














Discharge





- Discharge


Clinical Impression: 


Abdominal pain


Qualifiers:


 Abdominal location: generalized Qualified Code(s): R10.84 - Generalized 

abdominal pain





Nausea & vomiting


Qualifiers:


 Vomiting type: unspecified Vomiting Intractability: unspecified Qualified 

Code(s): R11.2 - Nausea with vomiting, unspecified





Condition: Stable


Disposition: HOME, SELF-CARE


Instructions:  Abdominal Pain ([facility]), Antinausea Medication ([facility])


Additional Instructions: 


You have been seen in the Emergency Department (ED) for abdominal pain.  Your 

evaluation did not identify a clear cause of your symptoms but was generally 

reassuring.


 


Please follow up with your doctor as soon as possible regarding today's emergent

visit and the symptoms that are bothering you.


 


Return to the ED if your abdominal pain worsens or fails to improve, you develop

bloody vomiting, bloody diarrhea, you are unable to tolerate fluids due to 

vomiting, fever greater than 101, or other symptoms that concern you.


Prescriptions: 


Ondansetron [Zofran Odt 4 mg Tablet] 4 mg PO Q4HP PRN #30 tab.rapdis


 PRN Reason: 


Dicyclomine HCl [Bentyl 20 mg Tablet] 20 mg PO QID #40 tablet


Referrals: 


MAREN VILLALTA DO [Primary Care Provider] - Follow up in 3-5 days


TOSHA DESAI MD [ACTIVE STAFF] - Follow up in 3-5 days

## 2020-06-06 ENCOUNTER — HOSPITAL ENCOUNTER (EMERGENCY)
Dept: HOSPITAL 62 - ER | Age: 45
LOS: 1 days | Discharge: HOME | End: 2020-06-07
Payer: MEDICAID

## 2020-06-06 DIAGNOSIS — E11.9: ICD-10-CM

## 2020-06-06 DIAGNOSIS — R11.10: ICD-10-CM

## 2020-06-06 DIAGNOSIS — R10.30: ICD-10-CM

## 2020-06-06 DIAGNOSIS — Z88.8: ICD-10-CM

## 2020-06-06 DIAGNOSIS — I10: ICD-10-CM

## 2020-06-06 DIAGNOSIS — R10.9: ICD-10-CM

## 2020-06-06 DIAGNOSIS — K43.9: Primary | ICD-10-CM

## 2020-06-06 PROCEDURE — 81025 URINE PREGNANCY TEST: CPT

## 2020-06-06 PROCEDURE — 99284 EMERGENCY DEPT VISIT MOD MDM: CPT

## 2020-06-06 PROCEDURE — 81001 URINALYSIS AUTO W/SCOPE: CPT

## 2020-06-06 PROCEDURE — 96375 TX/PRO/DX INJ NEW DRUG ADDON: CPT

## 2020-06-06 PROCEDURE — 83690 ASSAY OF LIPASE: CPT

## 2020-06-06 PROCEDURE — 74177 CT ABD & PELVIS W/CONTRAST: CPT

## 2020-06-06 PROCEDURE — 85025 COMPLETE CBC W/AUTO DIFF WBC: CPT

## 2020-06-06 PROCEDURE — 96361 HYDRATE IV INFUSION ADD-ON: CPT

## 2020-06-06 PROCEDURE — 96374 THER/PROPH/DIAG INJ IV PUSH: CPT

## 2020-06-06 PROCEDURE — 80053 COMPREHEN METABOLIC PANEL: CPT

## 2020-06-06 PROCEDURE — 36415 COLL VENOUS BLD VENIPUNCTURE: CPT

## 2020-06-06 NOTE — ER DOCUMENT REPORT
ED Medical Screen (RME)





- General


Stated Complaint: ABDOMINAL PAIN/VOMITING


Time Seen by Provider: 20 22:56


Primary Care Provider: 


MAREN VILLALTA DO [Primary Care Provider] - Follow up as needed


Notes: 





HPI: 44-year-old female presenting for left lower abdominal pain concerned about

an obstruction with a large abdominal wall hernia that she has had from her 

prior C-sections.  Patient has had 3 episodes of vomiting today.  States she did

move her bowels once this morning but then has a constant sensation of needing 

to move her bowel without being able to do so.  No fever.  Patient states she 

does not follow with a surgeon for repair of this issue





PHYSICAL EXAMINATION: Patient appears mildly uncomfortable.  Difficult abdominal

exam due to positioning in patients extremely large pannus.











I have greeted and performed a rapid initial assessment of this patient.  A 

comprehensive ED assessment and evaluation of the patient, analysis of test 

results and completion of medical decision making process will be conducted by 

an additional ED providers.


TRAVEL OUTSIDE OF THE [place] IN LAST 30 DAYS: No





- Related Data


Allergies/Adverse Reactions: 


                                        





chlorpheniramine [From [employer]] Allergy (Severe, Verified 19 06:31)


   headache, n and v


dextromethorphan tannate [From [employer] DS] Allergy (Severe, Verified 19 

06:31)


   headache, n and v


pseudoephedrine tannate [From [employer] DS] Allergy (Severe, Verified 19 

06:31)


   headache, n and v


NSAIDS (Non-Steroidal Anti-Inflamma Adverse Reaction (Verified 19 07:39)


   











Past Medical History





- Past Medical History


Cardiac Medical History: Reports: Hx Hypertension - no current meds


   Denies: Hx Coronary Artery Disease, Hx Heart Attack


Pulmonary Medical History: Reports: Hx Bronchitis -  hx of, Hx Pneumonia - hx of


   Denies: Hx Asthma, Hx COPD


Neurological Medical History: Denies: Hx Cerebrovascular Accident, Hx Seizures


Endocrine Medical History: Reports: Hx Diabetes Mellitus Type 2 - "borderline" 

per pt


Renal/ Medical History: Denies: Hx Peritoneal Dialysis


GI Medical History: Reports: Hx Gastroesophageal Reflux Disease


Musculoskeltal Medical History: Denies Hx Arthritis


Psychiatric Medical History: Reports: Hx Depression - anxiety


Past Surgical History: Reports: Hx  Section - x2, Hx Gynecologic Surgery

- novasure, vag repair, Hx Tonsillectomy, Hx Tubal Ligation, Other - Gastric 

sleeve surgery





- Immunizations


Immunizations up to date: No


Hx Diphtheria, Pertussis, Tetanus Vaccination: No





Physical Exam





- Vital signs


Vitals: 





                                        











Temp Pulse Resp BP Pulse Ox


 


 98.3 F   83   22 H  157/90 H  100 


 


 20 22:03  20 22:03  20 22:03  20 22:03  20 22:03














Course





- Vital Signs


Vital signs: 





                                        











Temp Pulse Resp BP Pulse Ox


 


 98.3 F   83   22 H  157/90 H  100 


 


 20 22:03  20 22:03  20 22:03  20 22:03  20 22:03














Doctor's Discharge





- Discharge


Referrals: 


MAREN VILLALTA DO [Primary Care Provider] - Follow up as needed

## 2020-06-07 VITALS — DIASTOLIC BLOOD PRESSURE: 68 MMHG | SYSTOLIC BLOOD PRESSURE: 126 MMHG

## 2020-06-07 LAB
ADD MANUAL DIFF: NO
ALBUMIN SERPL-MCNC: 4.6 G/DL (ref 3.5–5)
ALP SERPL-CCNC: 87 U/L (ref 38–126)
ANION GAP SERPL CALC-SCNC: 6 MMOL/L (ref 5–19)
APPEARANCE UR: (no result)
APTT PPP: YELLOW S
AST SERPL-CCNC: 22 U/L (ref 14–36)
BASOPHILS # BLD AUTO: 0 10^3/UL (ref 0–0.2)
BASOPHILS NFR BLD AUTO: 0.2 % (ref 0–2)
BILIRUB DIRECT SERPL-MCNC: 0 MG/DL (ref 0–0.4)
BILIRUB SERPL-MCNC: 0.7 MG/DL (ref 0.2–1.3)
BILIRUB UR QL STRIP: NEGATIVE
BUN SERPL-MCNC: 14 MG/DL (ref 7–20)
CALCIUM: 9.8 MG/DL (ref 8.4–10.2)
CHLORIDE SERPL-SCNC: 99 MMOL/L (ref 98–107)
CO2 SERPL-SCNC: 30 MMOL/L (ref 22–30)
EOSINOPHIL # BLD AUTO: 0 10^3/UL (ref 0–0.6)
EOSINOPHIL NFR BLD AUTO: 0.4 % (ref 0–6)
ERYTHROCYTE [DISTWIDTH] IN BLOOD BY AUTOMATED COUNT: 13.2 % (ref 11.5–14)
GLUCOSE SERPL-MCNC: 150 MG/DL (ref 75–110)
GLUCOSE UR STRIP-MCNC: NEGATIVE MG/DL
HCT VFR BLD CALC: 41.6 % (ref 36–47)
HGB BLD-MCNC: 14.3 G/DL (ref 12–15.5)
KETONES UR STRIP-MCNC: NEGATIVE MG/DL
LYMPHOCYTES # BLD AUTO: 0.9 10^3/UL (ref 0.5–4.7)
LYMPHOCYTES NFR BLD AUTO: 8.8 % (ref 13–45)
MCH RBC QN AUTO: 28.7 PG (ref 27–33.4)
MCHC RBC AUTO-ENTMCNC: 34.3 G/DL (ref 32–36)
MCV RBC AUTO: 84 FL (ref 80–97)
MONOCYTES # BLD AUTO: 0.2 10^3/UL (ref 0.1–1.4)
MONOCYTES NFR BLD AUTO: 2.3 % (ref 3–13)
NEUTROPHILS # BLD AUTO: 9.5 10^3/UL (ref 1.7–8.2)
NEUTS SEG NFR BLD AUTO: 88.3 % (ref 42–78)
NITRITE UR QL STRIP: NEGATIVE
PH UR STRIP: 8 [PH] (ref 5–9)
PLATELET # BLD: 272 10^3/UL (ref 150–450)
POTASSIUM SERPL-SCNC: 4.7 MMOL/L (ref 3.6–5)
PROT SERPL-MCNC: 8.1 G/DL (ref 6.3–8.2)
PROT UR STRIP-MCNC: NEGATIVE MG/DL
RBC # BLD AUTO: 4.97 10^6/UL (ref 3.72–5.28)
SP GR UR STRIP: 1.02
TOTAL CELLS COUNTED % (AUTO): 100 %
UROBILINOGEN UR-MCNC: NEGATIVE MG/DL (ref ?–2)
WBC # BLD AUTO: 10.8 10^3/UL (ref 4–10.5)

## 2020-06-07 NOTE — RADIOLOGY REPORT (SQ)
CT abdomen and pelvis with contrast on 6/7/2020 at 4:04 AM 



CLINICAL INDICATION: Lower abdominal pain



TECHNIQUE: Multiple axial images are obtained throughout the

abdomen and pelvis following the administration of IV and oral

contrast.  This exam was performed according to our departmental

dose-optimization program, which includes automated exposure

control, adjustment of the mA and/or kV according to patient size

and/or use of iterative reconstruction technique.

Total DLP is 2435.9 mGy*cm.



COMPARISON: 10/2/2019



FINDINGS: 



Abdomen: The lung bases are clear. The patient is status post

gastric sleeve surgery. There is a very small hiatal hernia.

There is fatty infiltration of the liver. The solid abdominal

organs are otherwise unremarkable. There is no abdominal

adenopathy. There is no free fluid or free air within the

abdomen. The abdominal portion of the GI tract is otherwise

unremarkable.



Pelvis: There is again noted a large multilobulated anterior

pelvic wall hernia containing multiple loops of nonobstructed

colon and small bowel. The ascending colon and cecum is noted to

extend into this hernia with the cecum in the left aspect of this

hernia in the patient's anterior left lower quadrant with a

normal appendix within the hernia. There is no evidence of

obstruction. Pelvic organs appear unremarkable by CT. No free

fluid is noted in the pelvis. There is no pelvic adenopathy. No

acute bony abnormality is noted.



IMPRESSION:

1. Stable appearance of a very large anterior pelvic wall hernia

that is multilobulated and containing multiple loops of

nonobstructed bowel.

2. Mild fatty infiltration of the liver.

## 2020-06-07 NOTE — ER DOCUMENT REPORT
Entered by ELINOR ROCHA SCRIBE  20 [id number] 





Acting as scribe for:SALVADOR CHAUDHARY IV, MD





ED GI/





- General


Chief Complaint: Abdominal Pain


Stated Complaint: ABDOMINAL PAIN/VOMITING


Time Seen by Provider: 20 22:56


Primary Care Provider: 


MAREN VILLALTA DO [Primary Care Provider] - Follow up as needed


Mode of Arrival: Ambulatory


Information source: Patient


Notes: 





This 44 year old female patient presents to the ED today with complaints of 

lower abdominal pain that started yesterday. Patient states that she has an 

incisional hernia due to prior C-sections. Patient reports that she is concerned

that she may have a possible obstruction. She notes x3 episodes of vomiting 

yesterday. She reports that she would usually wait for the pain to resolve on 

its own at home, but states that the pain was too severe, so she decided to come

to the ED. Denies fever.


TRAVEL OUTSIDE OF THE [place] IN LAST 30 DAYS: No





- Related Data


Allergies/Adverse Reactions: 


                                        





chlorpheniramine [From Atuss DS] Allergy (Severe, Verified 19 06:31)


   headache, n and v


dextromethorphan tannate [From Atuss DS] Allergy (Severe, Verified 19 

06:31)


   headache, n and v


pseudoephedrine tannate [From Atuss DS] Allergy (Severe, Verified 19 

06:31)


   headache, n and v


NSAIDS (Non-Steroidal Anti-Inflamma Adverse Reaction (Verified 19 07:39)


   











Past Medical History





- General


Information source: Patient, [facility] Records





- Social History


Smoking Status: Never Smoker


Cigarette use (# per day): No


Chew tobacco use (# tins/day): No


Smoking Education Provided: No


Frequency of alcohol use: None


Drug Abuse: None


Lives with: Family


Family History: Reviewed & Not Pertinent, CAD, DM, Hypertension, Thyroid Dis

function


Patient has suicidal ideation: No


Patient has homicidal ideation: No





- Past Medical History


Cardiac Medical History: Reports: Hx Hypertension - no current meds


Pulmonary Medical History: Reports: Hx Bronchitis, Hx Pneumonia


Endocrine Medical History: Reports: Hx Diabetes Mellitus Type 2 - "borderline" 

per pt


GI Medical History: Reports: Hx Gastroesophageal Reflux Disease


Psychiatric Medical History: Reports: Hx Anxiety, Hx Depression


Past Surgical History: Reports: Hx  Section - x2, Hx Gastric Bypass 

Surgery - gastric sleeve, Hx Gynecologic Surgery - novasure, vag repair, Hx 

Tonsillectomy, Hx Tubal Ligation





- Immunizations


Immunizations up to date: No


Hx Diphtheria, Pertussis, Tetanus Vaccination: No





Review of Systems





- Review of Systems


Constitutional: See HPI.  denies: Fever


EENT: No symptoms reported


Cardiovascular: No symptoms reported


Respiratory: No symptoms reported


Gastrointestinal: See HPI, Abdominal pain, Vomiting


Genitourinary: No symptoms reported


Female Genitourinary: No symptoms reported


Musculoskeletal: No symptoms reported


Skin: No symptoms reported


Hematologic/Lymphatic: No symptoms reported


Neurological/Psychological: No symptoms reported


-: Yes All other systems reviewed and negative





Physical Exam





- Vital signs


Vitals: 


                                        











Temp Pulse Resp BP Pulse Ox


 


 98.3 F   83   22 H  157/90 H  100 


 


 20 22:03  20 22:03  20 22:03  20 22:03  20 22:03














- General


General appearance: Alert





- HEENT


Head: Normocephalic, Atraumatic


Eyes: Normal


Pupils: PERRL





- Respiratory


Respiratory status: No respiratory distress


Chest status: Nontender


Breath sounds: Normal


Chest palpation: Normal





- Cardiovascular


Rhythm: Regular


Heart sounds: Normal auscultation


Murmur: No


Friction rub: No


Gallop: None auscultated





- Abdominal


Inspection: Other - Large infraumbilical hernia that is not reducible.


Bowel sounds: Normal


Tenderness: Tender - Tenderness to palpation near site of hernia


Organomegaly: No organomegaly





- Back


Back: Normal, Nontender





- Extremities


General upper extremity: Normal inspection


General lower extremity: Normal inspection





- Neurological


Neuro grossly intact: Yes


Orientation: AAOx4





- Psychological


Associated symptoms: Normal affect, Normal mood





- Skin


Skin Temperature: Warm


Skin Moisture: Dry


Skin Color: Normal





Course





- Re-evaluation


Re-evalutation: 





20 05:39


Results of ED MSE discussed with patient.  All questions were answered prior to 

discharge.  Emergency signs and symptoms, reasons to return to the emergency 

department discussed with patient.





- Vital Signs


Vital signs: 


                                        











Temp Pulse Resp BP Pulse Ox


 


 98.7 F   76   20   130/61 H  94 


 


 20 01:06  20 01:06  20 01:06  20 01:06  20 01:06














- Laboratory


Result Diagrams: 


                                 20 23:43





                                 20 23:43


Laboratory results interpreted by me: 


                                        











  20





  23:43 23:43 23:43


 


WBC  10.8 H  


 


Lymph % (Auto)  8.8 L  


 


Mono % (Auto)  2.3 L  


 


Absolute Neuts (auto)  9.5 H  


 


Seg Neutrophils %  88.3 H  


 


Sodium   135.0 L 


 


Glucose   150 H 


 


Ur Leukocyte Esterase    TRACE H














- Diagnostic Test


Radiology reviewed: Reports reviewed





Discharge





- Discharge


Clinical Impression: 


Abdominal pain


Qualifiers:


 Abdominal location: unspecified location Qualified Code(s): R10.9 - Unspecified

abdominal pain





Ventral hernia


Qualifiers:


 Obstruction and gangrene presence: without obstruction or gangrene Qualified 

Code(s): K43.9 - Ventral hernia without obstruction or gangrene





Condition: Good


Disposition: HOME, SELF-CARE


Instructions:  Abdominal Pain ([facility])


Additional Instructions: 


Return to the Emergency Department without delay if any worse.











HOME CARE INSTRUCTIONS & INFORMATION:  Thank you for choosing us for your 

medical needs. We hope you're satisfied with the care you received.  After you 

leave, you must properly care for your problem and, at the same time, observe 

its progress.  Any condition can change.  Some illnesses can change rapidly over

hours or days.  If your condition worsens, return to the Emergency Department or

see your physician promptly.





ABOUT YOUR X-RAYS AND EKG'S:   If you had an EKG or X-rays taken, they have been

read by the Emergency Physician. The X-rays and EKG's will also be read by a 

Radiologist or Cardiologist within 24 hours.  If discrepancies are noted, you w

ill be notified by telephone.  Please be certain the ED has a correct telephone 

number & address where you can be reached.  Also, realize that some fractures or

abnormalities do not show up on initial X-rays.  If your symptoms continue, see 

your physician.





ABOUT YOUR LABORATORY TEST:   If you had laboratory tests, the results have been

reviewed by the Emergency Physician.  Some test results (for example cultures) 

may not be available for several days.  You will be contacted if any test result

shows you need additional treatment.  Please be certain the ED has a correct 

telephone number and address where you can be reached.





ABOUT YOUR MEDICATIONS:  You will receive instructions on how to take your 

medicine on the prescription label you receive.  Additional information may be 

provided by the Pharmacy.  If you have questions afterwards, call the ED for 

clarification or further instructions.  Some prescribed medications may cause 

drowsiness.  Do not perform tasks such as driving a car or operating machinery 

without consulting your Pharmacist.  If you feel you need a refill of pain medic

ation, your condition will need re-evaluation.  Please do not call for a refill 

of any medication.





ABOUT YOUR SIGNATURE:   Signature of this document acknowledges to followin. Understanding that you received emergency treatment and that you may be 

   released before al medical problems are known or treated. Please be certain  

   the ED has a correct phone number & address where you can be reached.


   2. Acknowledgement that you will arrange for follow-up care as recommended.


   3. Authorization for the Emergency Physician to provide information to your 

follow-up Physician in order to maximize your care.





AT ANY TIME, IF YOUR SYMPTOMS CHANGE SIGNIFICANTLY OR WORSEN OR YOU DEVELOP NEW 

SYMPTOMS, RETURN TO THE EMERGENCY DEPARTMENT IMMEDIATELY FOR RE-EVALUATION.





OUR GOAL IS TO PROVIDE EXCELLENT MEDICAL CARE!





WE HOPE THAT WE HAVE MET YOUR EXPECTATIONS DURING YOUR EMERGENCY DEPARTMENT 

VISIT AND THAT YOU FEEL YOU HAVE RECEIVED EXCELLENT CARE!








Hernia





     You have a hernia.  A hernia forms at a weak spot in the abdominal wall.  

Bowel slips out of the abdominal cavity into the weak spot.  Hernias tend to 

occur in the groin (especially in males), the fold of the thigh, the naval, or 

at a surgical scar.


     Surgical repair of the defect is usually necessary.  The problem tends to 

get worse.  It's important that you follow up as recommended.


     For now, you should avoid straining, heavy lifting, and vigorous exercise.


     Complications occur if the hernia becomes tightly stuck.  You should come 

back immediately if the area becomes increasingly painful, swollen, or 

discolored, or if you develop abdominal pain and vomiting.


Referrals: 


MAREN VILLALTA, DO [Primary Care Provider] - Follow up as needed





I personally performed the services described in the documentation, reviewed and

edited the documentation which was dictated to the scribe in my presence, and it

accurately records my words and actions.

## 2020-10-03 ENCOUNTER — HOSPITAL ENCOUNTER (EMERGENCY)
Dept: HOSPITAL 62 - ER | Age: 45
Discharge: HOME | End: 2020-10-03
Payer: MEDICAID

## 2020-10-03 VITALS — SYSTOLIC BLOOD PRESSURE: 148 MMHG | DIASTOLIC BLOOD PRESSURE: 62 MMHG

## 2020-10-03 DIAGNOSIS — I10: ICD-10-CM

## 2020-10-03 DIAGNOSIS — Z98.84: ICD-10-CM

## 2020-10-03 DIAGNOSIS — Z88.8: ICD-10-CM

## 2020-10-03 DIAGNOSIS — R10.814: ICD-10-CM

## 2020-10-03 DIAGNOSIS — R10.32: ICD-10-CM

## 2020-10-03 DIAGNOSIS — D72.829: ICD-10-CM

## 2020-10-03 DIAGNOSIS — Z87.19: ICD-10-CM

## 2020-10-03 DIAGNOSIS — E66.01: ICD-10-CM

## 2020-10-03 DIAGNOSIS — K76.0: ICD-10-CM

## 2020-10-03 DIAGNOSIS — R11.11: ICD-10-CM

## 2020-10-03 DIAGNOSIS — K43.9: Primary | ICD-10-CM

## 2020-10-03 DIAGNOSIS — Z98.51: ICD-10-CM

## 2020-10-03 LAB
ADD MANUAL DIFF: NO
ALBUMIN SERPL-MCNC: 4.8 G/DL (ref 3.5–5)
ALP SERPL-CCNC: 89 U/L (ref 38–126)
ANION GAP SERPL CALC-SCNC: 12 MMOL/L (ref 5–19)
APPEARANCE UR: CLEAR
APTT PPP: YELLOW S
AST SERPL-CCNC: 24 U/L (ref 14–36)
BASOPHILS # BLD AUTO: 0.1 10^3/UL (ref 0–0.2)
BASOPHILS NFR BLD AUTO: 0.6 % (ref 0–2)
BILIRUB DIRECT SERPL-MCNC: 0.3 MG/DL (ref 0–0.4)
BILIRUB SERPL-MCNC: 1.1 MG/DL (ref 0.2–1.3)
BILIRUB UR QL STRIP: NEGATIVE
BUN SERPL-MCNC: 12 MG/DL (ref 7–20)
CALCIUM: 9.8 MG/DL (ref 8.4–10.2)
CHLORIDE SERPL-SCNC: 99 MMOL/L (ref 98–107)
CO2 SERPL-SCNC: 25 MMOL/L (ref 22–30)
EOSINOPHIL # BLD AUTO: 0.1 10^3/UL (ref 0–0.6)
EOSINOPHIL NFR BLD AUTO: 0.7 % (ref 0–6)
ERYTHROCYTE [DISTWIDTH] IN BLOOD BY AUTOMATED COUNT: 13.3 % (ref 11.5–14)
GLUCOSE SERPL-MCNC: 128 MG/DL (ref 75–110)
GLUCOSE UR STRIP-MCNC: NEGATIVE MG/DL
HCT VFR BLD CALC: 42.9 % (ref 36–47)
HGB BLD-MCNC: 13.2 G/DL (ref 12–15.5)
KETONES UR STRIP-MCNC: 20 MG/DL
LYMPHOCYTES # BLD AUTO: 1.3 10^3/UL (ref 0.5–4.7)
LYMPHOCYTES NFR BLD AUTO: 10.4 % (ref 13–45)
MCH RBC QN AUTO: 26 PG (ref 27–33.4)
MCHC RBC AUTO-ENTMCNC: 30.9 G/DL (ref 32–36)
MCV RBC AUTO: 84 FL (ref 80–97)
MONOCYTES # BLD AUTO: 0.6 10^3/UL (ref 0.1–1.4)
MONOCYTES NFR BLD AUTO: 4.5 % (ref 3–13)
NEUTROPHILS # BLD AUTO: 10.2 10^3/UL (ref 1.7–8.2)
NEUTS SEG NFR BLD AUTO: 83.8 % (ref 42–78)
NITRITE UR QL STRIP: NEGATIVE
PH UR STRIP: 5 [PH] (ref 5–9)
PLATELET # BLD: 207 10^3/UL (ref 150–450)
POTASSIUM SERPL-SCNC: 4.6 MMOL/L (ref 3.6–5)
PROT SERPL-MCNC: 8.2 G/DL (ref 6.3–8.2)
PROT UR STRIP-MCNC: NEGATIVE MG/DL
RBC # BLD AUTO: 5.09 10^6/UL (ref 3.72–5.28)
SP GR UR STRIP: 1.01
TOTAL CELLS COUNTED % (AUTO): 100 %
UROBILINOGEN UR-MCNC: NEGATIVE MG/DL (ref ?–2)
WBC # BLD AUTO: 12.2 10^3/UL (ref 4–10.5)

## 2020-10-03 PROCEDURE — 74022 RADEX COMPL AQT ABD SERIES: CPT

## 2020-10-03 PROCEDURE — 83690 ASSAY OF LIPASE: CPT

## 2020-10-03 PROCEDURE — 81001 URINALYSIS AUTO W/SCOPE: CPT

## 2020-10-03 PROCEDURE — 96375 TX/PRO/DX INJ NEW DRUG ADDON: CPT

## 2020-10-03 PROCEDURE — 87088 URINE BACTERIA CULTURE: CPT

## 2020-10-03 PROCEDURE — 85025 COMPLETE CBC W/AUTO DIFF WBC: CPT

## 2020-10-03 PROCEDURE — 80053 COMPREHEN METABOLIC PANEL: CPT

## 2020-10-03 PROCEDURE — 96361 HYDRATE IV INFUSION ADD-ON: CPT

## 2020-10-03 PROCEDURE — 87086 URINE CULTURE/COLONY COUNT: CPT

## 2020-10-03 PROCEDURE — 36415 COLL VENOUS BLD VENIPUNCTURE: CPT

## 2020-10-03 PROCEDURE — 99285 EMERGENCY DEPT VISIT HI MDM: CPT

## 2020-10-03 PROCEDURE — 96374 THER/PROPH/DIAG INJ IV PUSH: CPT

## 2020-10-03 PROCEDURE — 74177 CT ABD & PELVIS W/CONTRAST: CPT

## 2020-10-03 NOTE — ER DOCUMENT REPORT
ED Medical Screen (RME)





- General


Chief Complaint: Abdominal Pain


Stated Complaint: ABDOMINAL PAIN


Time Seen by Provider: 10/03/20 13:17


Primary Care Provider: 


MAREN VILLALTA DO [Primary Care Provider] - Follow up as needed


Mode of Arrival: Ambulatory


Information source: Patient


Notes: 





45-year-old female presented to ED for complaint of severe lower left abdominal 

pain.  She states is getting worse and the day progressed.  She has had several 

stools but the pain is still getting worse and worse.  She states when she does 

have stool she has excruciating hard sharp pain on the left side of her abdomen.

 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  We will have

her get her appointment.  She is a former smoker that she does not drink or use 

any illicit drugs.  She is alert oriented respirations regular nonlabored 

speaking in full sentences.

















I have greeted and performed a rapid initial assessment of this patient.  A 

comprehensive ED assessment and evaluation of the patient, analysis of test 

results and completion of medical decision making process will be conducted by 

an additional ED providers.


TRAVEL OUTSIDE OF THE [place] IN LAST 30 DAYS: No





- Related Data


Allergies/Adverse Reactions: 


                                        





chlorpheniramine [From Atuss DS] Allergy (Severe, Verified 19 06:31)


   headache, n and v


dextromethorphan tannate [From Atuss DS] Allergy (Severe, Verified 19 

06:31)


   headache, n and v


pseudoephedrine tannate [From Atuss DS] Allergy (Severe, Verified 19 06:

31)


   headache, n and v


NSAIDS (Non-Steroidal Anti-Inflamma Adverse Reaction (Verified 19 07:39)


   











Past Medical History





- Past Medical History


Cardiac Medical History: Reports: Hx Hypertension - no current meds


   Denies: Hx Coronary Artery Disease, Hx Heart Attack


Pulmonary Medical History: Reports: Hx Bronchitis, Hx Pneumonia


   Denies: Hx Asthma, Hx COPD


Neurological Medical History: Denies: Hx Cerebrovascular Accident, Hx Seizures


Endocrine Medical History: Reports: Hx Diabetes Mellitus Type 2 - "borderline" 

per pt


Renal/ Medical History: Denies: Hx Peritoneal Dialysis


GI Medical History: Reports: Hx Gastroesophageal Reflux Disease


Musculoskeltal Medical History: Denies Hx Arthritis


Psychiatric Medical History: Reports: Hx Anxiety, Hx Depression


Past Surgical History: Reports: Hx  Section - x2, Hx Gastric Bypass 

Surgery - gastric sleeve, Hx Gynecologic Surgery - novasure, vag repair, Hx 

Tonsillectomy, Hx Tubal Ligation, Other - Gastric sleeve surgery





- Immunizations


Immunizations up to date: No


Hx Diphtheria, Pertussis, Tetanus Vaccination: No





Physical Exam





- Vital signs


Vitals: 





                                        











Temp Pulse Resp BP Pulse Ox


 


 97.7 F   77   20   165/79 H  100 


 


 10/03/20 13:05  10/03/20 13:05  10/03/20 13:05  10/03/20 13:05  10/03/20 13:05














Course





- Vital Signs


Vital signs: 





                                        











Temp Pulse Resp BP Pulse Ox


 


 97.7 F   77   20   165/79 H  100 


 


 10/03/20 13:05  10/03/20 13:05  10/03/20 13:05  10/03/20 13:05  10/03/20 13:05














Doctor's Discharge





- Discharge


Referrals: 


MAREN VILLALTA DO [Primary Care Provider] - Follow up as needed

## 2020-10-03 NOTE — RADIOLOGY REPORT (SQ)
EXAM DESCRIPTION:  CT ABD/PELVIS WITH IV   ORAL



IMAGES COMPLETED DATE/TIME:  10/3/2020 6:42 pm



REASON FOR STUDY:  LLQ abd pain and vomiting, sudden onset today.  Gastric sleeve surgery.



COMPARISON:  6/7/2020



TECHNIQUE:  CT scan of the abdomen and pelvis performed using helical scanning technique with dynamic
 intravenous contrast injection.  No oral contrast. Images reviewed with lung, soft tissue, and bone 
windows. Reconstructed coronal and sagittal MPR images reviewed. Delayed images for evaluation of the
 urinary system also acquired. All images stored on PACS.

All CT scanners at this facility use dose modulation, iterative reconstruction, and/or weight based d
osing when appropriate to reduce radiation dose to as low as reasonably achievable (ALARA).

[facility]: Dose Right  [facility]: CareDose    [facility]: Dose Right    [facility]: Teradose 4D    [facility]: Smart Technologies



CONTRAST TYPE AND DOSE:  contrast/concentration: Isovue 350.00 mmol/ml; Total Contrast Delivered: 98.
9 ml; Total Saline Delivered: 15.0 ml



RENAL FUNCTION:  GFR > 60.



RADIATION DOSE:  CT Rad equipment meets quality standard of care and radiation dose reduction techniq
ues were employed. CTDIvol: 19.2 - 21.1 mGy. DLP: 2362 mGy-cm..



LIMITATIONS:  None.



FINDINGS:  LOWER CHEST: No significant findings. No nodules or infiltrates.

LIVER: Liver has normal size and contour.  Moderate diffuse hepatic steatosis.  No focal hepatic mass
.  Hepatic and portal veins are patent.  No biliary ductal dilation.

SPLEEN: Normal size. No focal lesions.

PANCREAS: No masses. No significant calcifications. No adjacent inflammation or peripancreatic fluid 
collections. Pancreatic duct not dilated.

GALLBLADDER: No identified stones by CT criteria. No inflammatory changes to suggest cholecystitis.

ADRENAL GLANDS: No significant masses or asymmetry.

RIGHT KIDNEY AND URETER: No solid masses.   No significant calcifications.   No hydronephrosis or hyd
roureter.

LEFT KIDNEY AND URETER: No solid masses.   No significant calcifications.   No hydronephrosis or hydr
oureter.

AORTA AND VESSELS: No aneurysm. No dissection. Renal arteries, SMA, celiac without stenosis.

RETROPERITONEUM: No retroperitoneal adenopathy, hemorrhage or masses.

BOWEL AND PERITONEAL CAVITY: Very large anterior abdominal wall hernia with 2 components, at the vent
ral midline the abdominal wall defect measures about 8.6 cm diameter and in the left lower abdomen th
e abdominal wall defect measures about 3.9 cm diameter.  The cecum appears to be herniated into the l
eft anterior abdominal wall hernia along with multiple loops of small bowel and ascending and transve
rse colon, similar in appearance to previous examination.  Enteric contrast has progressed into the s
mall bowel loops within the hernia sac as well is distal to this, without evidence of small bowel obs
truction.  No fluid in the hernia sac.  No surrounding inflammatory change.  No masses or inflammator
y changes. No free fluid or peritoneal masses.  Prior gastric sleeve surgery, stable in appearance fr
om previous.

APPENDIX: Normal, contained within the ventral hernia.

PELVIS: No mass.  No free fluid. Normal bladder.

ABDOMINAL WALL: Large ventral hernia containing loops of small bowel and colon as described above.  N
o subcutaneous mass or fluid.

BONES: No significant or acute findings.

OTHER: No other significant finding.



IMPRESSION:

1. Large ventral abdominal hernia containing loops of small bowel and the colon as well as the cecum 
and appendix.  No evidence of incarceration or bowel obstruction.  This is similar in appearance over
 multiple previous examinations.

2. Moderate hepatic steatosis.

3. Post gastric sleeve surgery.



TECHNICAL DOCUMENTATION:  JOB ID:  [id number]

Quality ID # [id number]: Final reports with documentation of one or more dose reduction techniques (e.g., Au
tomated exposure control, adjustment of the mA and/or kV according to patient size, use of iterative 
reconstruction technique)

 2011 [employer]- All Rights Reserved



Reading location - IP/workstation name: [email or web address]

## 2020-10-03 NOTE — RADIOLOGY REPORT (SQ)
EXAM DESCRIPTION:  ACUTE ABDOMEN SERIES



IMAGES COMPLETED DATE/TIME:  10/3/2020 1:57 pm



REASON FOR STUDY:  Severe abdominal pain right ventricle hernia



COMPARISON:  10/1/2019.



NUMBER OF VIEWS:  Three views.



TECHNIQUE:  Frontal chest, supine abdomen and upright/decubitus abdomen radiographic images acquired.




LIMITATIONS:  None.



FINDINGS:  CHEST: Lungs clear of infiltrates.

FREE AIR: None. No abnormal gas collections.

BOWEL GAS PATTERN: Nonobstructive pattern. No dilated loops or air fluid levels.

CALCIFICATIONS: No suspicious calcifications.

HARDWARE: None in the abdomen.

SOFT TISSUES: No gross mass or suggestion of organomegaly.

BONES: No acute fracture.  Degenerative changes in the spine.  No worrisome bone lesions.

OTHER: No other significant finding.



IMPRESSION:  NO RADIOGRAPHIC EVIDENCE FOR ACUTE ABDOMINAL DISEASE.



TECHNICAL DOCUMENTATION:  JOB ID:  [id number]

 2011 [employer]- All Rights Reserved



Reading location - IP/workstation name: [email or web address]

## 2020-10-03 NOTE — ER DOCUMENT REPORT
ED General





<JR CHOPRA - Last Filed: 10/04/20 01:19>





- General


Mode of Arrival: Ambulatory


TRAVEL OUTSIDE OF THE [place] IN LAST 30 DAYS: No





<MARCELA ENRIQUE - Last Filed: 10/06/20 12:18>





- General


Chief Complaint: Abdominal Pain


Stated Complaint: ABDOMINAL PAIN


Time Seen by Provider: 10/03/20 13:17


Primary Care Provider: 


LLOYD CHURCH MD [ACTIVE STAFF] - 10/05/20


MARCK CHRISTENSEN MD [ACTIVE STAFF] - Follow up as needed


MAREN VILLALTA DO [NO LOCAL MD] - Follow up as needed





- [facility]


Notes: 





43-year-old female with a history of left incisional hernia presents this 

morning with sudden onset abdominal pain at 0430 this morning as well as 

vomiting at least twice this morning.  Reports pain is become progressively 

worse.  Is not tried any over-the-counter medications.  Reports her last bowel 

movement was this morning, normal and tolerating contacts.  Patient states that 

she has not been seen by a surgeon due to lack of insurance to have her hernia 

repaired.  She is unable to reduce her hernia.  Reports pain is 4 out of 5, 

throbbing achy and sharp.  Patient states she has these exacerbations of her 

hernial pain that brings her to the emergency which she needs more medication 

than she can get outpatient.  Denies any chest pain, shortness of breath, 

headache, blurred vision double vision loss of vision, fever chills, weakness, 

bowel or bladder dysfunction, saddle anesthesia, hematuria, numbness or tingling

down her lower legs, headaches, lightheadedness, dizziness.  Reports pain is 

only where she is having the hernia at this time.








MEDICATIONS: I agree with the patient medications as charted by the RN.





ALLERGIES: I agree with the allergies as charted by the RN.





PAST MEDICAL HISTORY/PAST SURGICAL HISTORY: Reviewed and agree as charted by RN.





SOCIAL HISTORY: Reviewed and agree as charted by RN.





FAMILY HISTORY: No significant familial comorbid conditions directly related to 

patient complaint





EXAM:


Reviewed vital signs as charted by RN.





REVIEW OF SYSTEMS:reviewed vital signs by RN


CONSTITUTIONAL :  Denies fever,  chills, or sweats.  Denies recent illness.


EENT:   Denies eye, ear, throat, or mouth pain or symptoms.  Denies nasal or 

sinus congestion or discharge.  Denies throat, tongue, or mouth swelling or 

difficulty swallowing.


CARDIOVASCULAR:  Denies chest pain.  Denies palpitations or racing or irregular 

heart beat.  Denies ankle edema.


RESPIRATORY:  Denies cough, cold, or chest congestion.  Denies shortness of 

breath, difficulty breathing, or wheezing.


GASTROINTESTINAL:  reports  abdominal pain. denies distention.  Denies nausea, 

reports vomiting. denies  diarrhea.  Denies blood in vomitus, stools, or per 

rectum.  Denies black, tarry stools.  Denies constipation. 


GENITOURINARY:  Denies difficulty urinating, painful urination, burning, 

frequency, blood in urine, or discharge.


FEMALE  GENITOURINARY:  Denies vaginal bleeding, heavy or abnormal periods, ir

regular periods.  Denies vaginal discharge or odor. 


MUSCULOSKELETAL:  Denies back or neck pain or stiffness.  Denies joint pain or 

swelling.


SKIN:   Denies rash, lesions or sores.


HEMATOLOGIC :   Denies easy bruising or bleeding.


LYMPHATIC:  Denies swollen, enlarged glands.


NEUROLOGICAL:  Denies confusion or altered mental status.  Denies passing out or

loss of consciousness.  Denies dizziness or lightheadedness.  Denies headache.  

Denies weakness or paralysis or loss of use of either side.  Denies problems 

with gait or speech.  Denies sensory loss, numbness, or tingling.  Denies 

seizures.


PSYCHIATRIC:  Denies anxiety or stress.  Denies depression, suicidal ideation, 

or homicidal ideation.





ALL OTHER SYSTEMS REVIEWED AND NEGATIVE.











PHYSICAL EXAMINATION:





GENERAL: Well-appearing, well-nourished and in no acute distress.





HEAD: Atraumatic, normocephalic.





EYES: Pupils equal round and reactive to light, extraocular movements intact, 

conjunctiva are normal.





ENT: Nares patent, oropharynx clear without exudates.  Moist mucous membranes.





NECK: Normal range of motion, supple without lymphadenopathy





LUNGS: Breath sounds clear to auscultation bilaterally and equal.  No wheezes 

rales or rhonchi.





HEART: Regular rate and rhythm without murmurs





ABDOMEN:  soft, nondistended abdomen with tenderness to left lower quadrant.  

Large infra umbilical hernia that cannot be reduced. no guarding, no rebound.  

No masses appreciated.





Female : deferred





Musculoskeletal: Normal range of motion, no pitting or edema.  No cyanosis.





NEUROLOGICAL: Cranial nerves grossly intact.  Normal speech, normal gait.  

Normal sensory, motor exams





PSYCH: Normal mood, normal affect.





SKIN: Warm, Dry, normal turgor, no rashes or lesions noted.

















Dictation was performed using Dragon voice recognition software (MARCELA ENRIQUE)





- Related Data


Allergies/Adverse Reactions: 


                                        





chlorpheniramine [From [employer]] Allergy (Severe, Verified 19 06:31)


   headache, n and v


dextromethorphan tannate [From [employer] [facility]] Allergy (Severe, Verified 19 

06:31)


   headache, n and v


pseudoephedrine tannate [From [employer] [facility]] Allergy (Severe, Verified 19 

06:31)


   headache, n and v


NSAIDS (Non-Steroidal Anti-Inflamma Adverse Reaction (Verified 19 07:39)


   











Past Medical History





- Social History


Smoking Status: Unknown if Ever Smoked





<JR CHOPRA - Last Filed: 10/04/20 01:19>





- General


Information source: Patient





- Social History


Family History: Reviewed & Not Pertinent, CAD, DM, Hypertension, Thyroid 

Disfunction





- Past Medical History


Cardiac Medical History: Reports: Hx Hypertension - no current meds


   Denies: Hx Coronary Artery Disease, Hx Heart Attack


Pulmonary Medical History: Reports: Hx Bronchitis, Hx Pneumonia


   Denies: Hx Asthma, Hx COPD


Neurological Medical History: Denies: Hx Cerebrovascular Accident, Hx Seizures


Endocrine Medical History: Reports: Hx Diabetes Mellitus Type 2 - "borderline" 

per pt


Renal/ Medical History: Denies: Hx Peritoneal Dialysis


GI Medical History: Reports: Hx Gastroesophageal Reflux Disease


Musculoskeletal Medical History: Denies Hx Arthritis


Psychiatric Medical History: Reports: Hx Anxiety, Hx Depression


Past Surgical History: Reports: Hx  Section - x2, Hx Gastric Bypass 

Surgery - gastric sleeve, Hx Gynecologic Surgery - novasure, vag repair, Hx 

Tonsillectomy, Hx Tubal Ligation, Other - Gastric sleeve surgery





- Immunizations


Immunizations up to date: No


Hx Diphtheria, Pertussis, Tetanus Vaccination: No





<MARCELA ENRIQUE - Last Filed: 10/06/20 12:18>





Physical Exam





- Vital signs


Vitals: 


                                        











Temp Pulse Resp BP Pulse Ox


 


 97.7 F   77   20   165/79 H  100 


 


 10/03/20 13:05  10/03/20 13:05  10/03/20 13:05  10/03/20 13:05  10/03/20 13:05














Course





- Laboratory


Result Diagrams: 


                                 10/03/20 13:40





                                 10/03/20 13:40





<JR CHOPRA - Last Filed: 10/04/20 01:19>





- Laboratory


Result Diagrams: 


                                 10/03/20 13:40





                                 10/03/20 13:40





<MARCELA ENRIQUE - Last Filed: 10/06/20 12:18>





- Re-evaluation


Re-evalutation: 





10/03/20 20:05


Bedside report given to me by Marcela Enrique NP. 


10/03/20 20:41


CT of the abdomen pelvis with IV and oral contrast show a normal ventral hernia.

 No obstruction or strangulation noted.  Advised patient to follow-up with 

surgery on an outpatient basis.  Patient already has a close mean and states 

that it does help sometimes.  She also has senna, which I encouraged her to 

continue to continue to take if needed for constipation.  Offered nausea 

medication, but she states that she does not need any at this time.  States that

she only has nausea and vomiting when she has the pain.  Follow-up precautions 

were given.  Verbal discharge instructions were given to the patient.  They verb

alized understanding.  They are stable for discharge.








 (KEYSHAJR RAY)





10/03/20 17:23


Afebrile vital stable no distress.  Nurses notes reviewed.  CBC shows a slight 

leukocytosis, CMP negative for hepatic or renal dysfunction.  CT abdomen pelvis 

with oral and IV contrast still pending.  Patient states she did have relief of 

pain with having pain medication and IV fluids. 2000: disposition given to KATIE Mcmahon 


10/06/20 12:17


 (MARCELA ENRIQUE)





- Vital Signs


Vital signs: 


                                        











Temp Pulse Resp BP Pulse Ox


 


 97.9 F   70   18   148/62 H  99 


 


 10/03/20 21:14  10/03/20 21:14  10/03/20 21:14  10/03/20 21:14  10/03/20 21:14














- Laboratory


Laboratory results interpreted by me: 


                                        











  10/03/20 10/03/20 10/03/20





  13:30 13:40 13:40


 


WBC   12.2 H 


 


MCH   26.0 L 


 


MCHC   30.9 L 


 


Lymph % (Auto)   10.4 L 


 


Absolute Neuts (auto)   10.2 H 


 


Seg Neutrophils %   83.8 H 


 


Sodium    136.4 L


 


Glucose    128 H


 


Urine Ketones  20 H  


 


Ur Leukocyte Esterase  SMALL H  














Discharge





<JR CHOPRA - Last Filed: 10/04/20 01:19>





<MARCELA ENRIQUE - Last Filed: 10/06/20 12:18>





- Discharge


Clinical Impression: 


 Morbid obesity





Ventral hernia


Qualifiers:


 Obstruction and gangrene presence: without obstruction or gangrene Qualified 

Code(s): K43.9 - Ventral hernia without obstruction or gangrene





Condition: Stable


Disposition: HOME, SELF-CARE


Additional Instructions: 


You were seen today in the emergency department for abdominal pain, nausea, and 

vomiting.  The CT shows that your hernia is stable.  Please follow-up with mason herrera on Monday.  Have them address your hernia.


Referrals: 


MARCK CHRISTENSEN MD [ACTIVE STAFF] - Follow up as needed


MAREN VILLALTA DO [NO LOCAL MD] - Follow up as needed


LLOYD CHURCH MD [ACTIVE STAFF] - 10/05/20

## 2020-10-13 ENCOUNTER — HOSPITAL ENCOUNTER (OUTPATIENT)
Dept: HOSPITAL 62 - OD | Age: 45
End: 2020-10-13
Attending: NURSE PRACTITIONER
Payer: MEDICAID

## 2020-10-13 DIAGNOSIS — M79.671: Primary | ICD-10-CM

## 2020-10-13 NOTE — RADIOLOGY REPORT (SQ)
EXAM DESCRIPTION:  FOOT RIGHT COMPLETE



IMAGES COMPLETED DATE/TIME:  10/13/2020 4:09 pm



REASON FOR STUDY:  (M79.671) PAIN IN RIGHT FOOT M79.671  PAIN IN RIGHT FOOT



COMPARISON:  None.



NUMBER OF VIEWS:  Three views.



TECHNIQUE:  AP, lateral and oblique  radiographic images acquired of the right foot.



LIMITATIONS:  None.



FINDINGS:  MINERALIZATION: Normal.

BONES: No acute fracture or dislocation.  No worrisome bone lesions.

JOINTS: No effusions.

SOFT TISSUES: No soft tissue swelling.  No foreign body.

OTHER: Prominent calcaneal spur at the insertion site of the plantar aponeurosis.



IMPRESSION:  Prominent calcaneal spur.  No acute fracture or dislocation.



TECHNICAL DOCUMENTATION:  JOB ID:  [id number]

 2011 [employer]- All Rights Reserved



Reading location - IP/workstation name: [email or web address]

## 2020-11-03 ENCOUNTER — HOSPITAL ENCOUNTER (EMERGENCY)
Dept: HOSPITAL 62 - ER | Age: 45
Discharge: HOME | End: 2020-11-03
Payer: COMMERCIAL

## 2020-11-03 VITALS — DIASTOLIC BLOOD PRESSURE: 59 MMHG | SYSTOLIC BLOOD PRESSURE: 141 MMHG

## 2020-11-03 DIAGNOSIS — K43.2: Primary | ICD-10-CM

## 2020-11-03 DIAGNOSIS — R10.9: ICD-10-CM

## 2020-11-03 DIAGNOSIS — E66.01: ICD-10-CM

## 2020-11-03 DIAGNOSIS — I10: ICD-10-CM

## 2020-11-03 DIAGNOSIS — R73.03: ICD-10-CM

## 2020-11-03 LAB
ADD MANUAL DIFF: NO
ALBUMIN SERPL-MCNC: 4.6 G/DL (ref 3.5–5)
ALP SERPL-CCNC: 83 U/L (ref 38–126)
ANION GAP SERPL CALC-SCNC: 8 MMOL/L (ref 5–19)
APPEARANCE UR: CLEAR
APTT PPP: COLORLESS S
AST SERPL-CCNC: 21 U/L (ref 14–36)
BASOPHILS # BLD AUTO: 0 10^3/UL (ref 0–0.2)
BASOPHILS NFR BLD AUTO: 0.5 % (ref 0–2)
BILIRUB DIRECT SERPL-MCNC: 0 MG/DL (ref 0–0.4)
BILIRUB SERPL-MCNC: 0.8 MG/DL (ref 0.2–1.3)
BILIRUB UR QL STRIP: NEGATIVE
BUN SERPL-MCNC: 13 MG/DL (ref 7–20)
CALCIUM: 9.9 MG/DL (ref 8.4–10.2)
CHLORIDE SERPL-SCNC: 102 MMOL/L (ref 98–107)
CO2 SERPL-SCNC: 29 MMOL/L (ref 22–30)
EOSINOPHIL # BLD AUTO: 0.1 10^3/UL (ref 0–0.6)
EOSINOPHIL NFR BLD AUTO: 1.4 % (ref 0–6)
ERYTHROCYTE [DISTWIDTH] IN BLOOD BY AUTOMATED COUNT: 13.6 % (ref 11.5–14)
GLUCOSE SERPL-MCNC: 119 MG/DL (ref 75–110)
GLUCOSE UR STRIP-MCNC: NEGATIVE MG/DL
HCT VFR BLD CALC: 41.2 % (ref 36–47)
HGB BLD-MCNC: 14 G/DL (ref 12–15.5)
KETONES UR STRIP-MCNC: NEGATIVE MG/DL
LYMPHOCYTES # BLD AUTO: 1.3 10^3/UL (ref 0.5–4.7)
LYMPHOCYTES NFR BLD AUTO: 16.1 % (ref 13–45)
MCH RBC QN AUTO: 28.8 PG (ref 27–33.4)
MCHC RBC AUTO-ENTMCNC: 34.1 G/DL (ref 32–36)
MCV RBC AUTO: 85 FL (ref 80–97)
MONOCYTES # BLD AUTO: 0.4 10^3/UL (ref 0.1–1.4)
MONOCYTES NFR BLD AUTO: 5.3 % (ref 3–13)
NEUTROPHILS # BLD AUTO: 6.3 10^3/UL (ref 1.7–8.2)
NEUTS SEG NFR BLD AUTO: 76.7 % (ref 42–78)
NITRITE UR QL STRIP: NEGATIVE
PH UR STRIP: 7 [PH] (ref 5–9)
PLATELET # BLD: 256 10^3/UL (ref 150–450)
POTASSIUM SERPL-SCNC: 5.1 MMOL/L (ref 3.6–5)
PROT SERPL-MCNC: 8 G/DL (ref 6.3–8.2)
PROT UR STRIP-MCNC: NEGATIVE MG/DL
RBC # BLD AUTO: 4.87 10^6/UL (ref 3.72–5.28)
SP GR UR STRIP: 1
TOTAL CELLS COUNTED % (AUTO): 100 %
UROBILINOGEN UR-MCNC: NEGATIVE MG/DL (ref ?–2)
WBC # BLD AUTO: 8.2 10^3/UL (ref 4–10.5)

## 2020-11-03 PROCEDURE — 83690 ASSAY OF LIPASE: CPT

## 2020-11-03 PROCEDURE — 80053 COMPREHEN METABOLIC PANEL: CPT

## 2020-11-03 PROCEDURE — 81025 URINE PREGNANCY TEST: CPT

## 2020-11-03 PROCEDURE — 99283 EMERGENCY DEPT VISIT LOW MDM: CPT

## 2020-11-03 PROCEDURE — 36415 COLL VENOUS BLD VENIPUNCTURE: CPT

## 2020-11-03 PROCEDURE — 81001 URINALYSIS AUTO W/SCOPE: CPT

## 2020-11-03 PROCEDURE — 85025 COMPLETE CBC W/AUTO DIFF WBC: CPT

## 2020-11-03 NOTE — ER DOCUMENT REPORT
ED General





- General


Chief Complaint: Abdominal Pain


Stated Complaint: SEVERE ABDOMINAL PAIN HISTORY OF HERNIA


Time Seen by Provider: 20 09:01


Primary Care Provider: 


DADA BECKER FNP [Primary Care Provider] - Follow up as needed


Information source: Patient


TRAVEL OUTSIDE OF THE [place] IN LAST 30 DAYS: No





- HPI


Notes: 





Patient presents with abdominal pain.  She states it started about 1:30 in the 

morning.  It was severe.  Is now improved but still present.  It is located 

mainly on the left side of her abdomen but radiates across both sides of the 

abdomen.  It has been relatively constant.  It is worse with movement and better

with rest.  She states that she has a known hernia in this area that has an 

incisional hernia from a previous .  She states she has seen several 

surgeons but they have told her that she has to lose weight before they can 

repair this hernia.  She has not had any vomiting but she did have some nausea. 

She states she was here 1 month ago and at that time it was worse because she 

was vomiting.





- Related Data


Allergies/Adverse Reactions: 


                                        





chlorpheniramine [From [employer]] Allergy (Severe, Verified 20 06:56)


   headache, n and v


dextromethorphan tannate [From [employer]] Allergy (Severe, Verified 20 

06:56)


   headache, n and v


pseudoephedrine tannate [From [employer]] Allergy (Severe, Verified 20 

06:56)


   headache, n and v


NSAIDS (Non-Steroidal Anti-Inflamma Adverse Reaction (Verified 20 06:56)


   











Past Medical History





- General


Information source: Patient





- Social History


Smoking Status: Former Smoker


Frequency of alcohol use: None


Drug Abuse: None


Family History: Reviewed & Not Pertinent, CAD, DM, Hypertension, Thyroid 

Disfunction





- Past Medical History


Cardiac Medical History: Reports: Hx Hypertension - no current meds


   Denies: Hx Coronary Artery Disease, Hx Heart Attack


Pulmonary Medical History: Reports: Hx Bronchitis, Hx Pneumonia


   Denies: Hx Asthma, Hx COPD


Neurological Medical History: Denies: Hx Cerebrovascular Accident, Hx Seizures


Endocrine Medical History: Reports: Hx Diabetes Mellitus Type 2 - "borderline" 

per pt


Renal/ Medical History: Denies: Hx Peritoneal Dialysis


GI Medical History: Reports: Hx Gastroesophageal Reflux Disease


Musculoskeletal Medical History: Denies Hx Arthritis


Psychiatric Medical History: Reports: Hx Anxiety, Hx Depression


Past Surgical History: Reports: Hx  Section - x2, Hx Gastric Bypass 

Surgery - gastric sleeve, Hx Gynecologic Surgery - novasure, vag repair, Hx 

Tonsillectomy, Hx Tubal Ligation, Other - Gastric sleeve surgery





- Immunizations


Immunizations up to date: No


Hx Diphtheria, Pertussis, Tetanus Vaccination: No





Review of Systems





- Review of Systems


Constitutional: denies: Chills, Fever


Cardiovascular: denies: Chest pain, Palpitations


Respiratory: denies: Cough, Short of breath


-: Yes All other systems reviewed and negative





Physical Exam





- Vital signs


Vitals: 





                                        











Temp Pulse Resp BP Pulse Ox


 


 98.3 F   72   32 H  172/79 H  100 


 


 20 06:49  20 06:49  20 06:49  20 06:49  20 06:49











Interpretation: Hypertensive





- General


General appearance: Appears well, Alert





- HEENT


Head: Normocephalic, Atraumatic


Eyes: Normal


Pupils: PERRL





- Respiratory


Respiratory status: No respiratory distress


Chest status: Nontender


Breath sounds: Normal


Chest palpation: Normal





- Cardiovascular


Rhythm: Regular


Heart sounds: Normal auscultation


Murmur: No





- Abdominal


Inspection: Morbidly Obese


Bowel sounds: Normal


Tenderness: Tender - Patient has abdominal tenderness that is diffuse but 

greatest on the left lateral aspect of the incisional hernia.  She does have 

reducible bile inside of the hernia.  Her exam is not consistent with 

incarceration.  There is no warmth redness or induration.  And I am able to 

manipulate the bowel without problem.





- Back


Back: Normal, Nontender





- Extremities


General upper extremity: Normal inspection, Nontender, Normal color, Normal ROM,

Normal temperature


General lower extremity: Normal inspection, Nontender, Normal color, Normal ROM,

Normal temperature, Normal weight bearing.  No: Wyatt's sign





- Neurological


Neuro grossly intact: Yes


Cognition: Normal


Orientation: AAOx4


Gary Coma Scale Eye Opening: Spontaneous


Samina Coma Scale Verbal: Oriented


Samina Coma Scale Motor: Obeys Commands


Samina Coma Scale Total: 15


Speech: Normal


Motor strength normal: LUE, RUE, LLE, RLE


Sensory: Normal





- Psychological


Associated symptoms: Normal affect, Normal mood





- Skin


Skin Temperature: Warm


Skin Moisture: Dry


Skin Color: Normal





Course





- Re-evaluation


Re-evalutation: 





20 09:50


Patient is laboratories and unremarkable.  Patient's vital signs are stable 

other than the moderately elevated blood pressure.  I discussed imaging with the

patient versus outpatient follow-up.  Due to the amount of imaging patient has 

had she chose to forego imaging at this point and follow-up as an outpatient 

which I believe is reasonable.  I do not see any signs of incarceration or any 

signs that the hernia would require emergent repair.





- Vital Signs


Vital signs: 





                                        











Temp Pulse Resp BP Pulse Ox


 


 98.3 F   72   32 H  172/79 H  100 


 


 20 06:49  20 06:49  20 06:49  20 06:49  20 06:49














- Laboratory


Result Diagrams: 


                                 20 08:00





                                 20 08:00


Laboratory results interpreted by me: 





                                        











  20





  08:00


 


Potassium  5.1 H


 


Glucose  119 H














Discharge





- Discharge


Clinical Impression: 


 Incisional hernia of anterior abdominal wall without obstruction or gangrene, 

Morbid obesity





Condition: Stable


Disposition: HOME, SELF-CARE


Instructions:  Abdominal Pain ([facility]), Hernia ([facility])


Additional Instructions: 


Please follow up with surgery as soon as possible


Prescriptions: 


Peg 3350/Na Sulf,Bicarb,Cl/KCl [Golytely Solution 4000 ml] 4,000 ml PO DAILY #1 

bottle


Tramadol HCl [Ultram] 50 mg PO Q6 PRN 3 Days #12 tablet


 PRN Reason: For Pain


Forms:  Return to Work


Referrals: 


DADA BECKER FNP [Primary Care Provider] - Follow up as needed

## 2021-01-28 ENCOUNTER — HOSPITAL ENCOUNTER (EMERGENCY)
Dept: HOSPITAL 62 - ER | Age: 46
Discharge: HOME | End: 2021-01-28
Payer: COMMERCIAL

## 2021-01-28 VITALS — SYSTOLIC BLOOD PRESSURE: 151 MMHG | DIASTOLIC BLOOD PRESSURE: 71 MMHG

## 2021-01-28 DIAGNOSIS — R10.84: ICD-10-CM

## 2021-01-28 DIAGNOSIS — K43.9: Primary | ICD-10-CM

## 2021-01-28 DIAGNOSIS — Z87.891: ICD-10-CM

## 2021-01-28 DIAGNOSIS — R42: ICD-10-CM

## 2021-01-28 DIAGNOSIS — I10: ICD-10-CM

## 2021-01-28 DIAGNOSIS — E66.01: ICD-10-CM

## 2021-01-28 DIAGNOSIS — Z88.8: ICD-10-CM

## 2021-01-28 DIAGNOSIS — R11.2: ICD-10-CM

## 2021-01-28 DIAGNOSIS — Z98.84: ICD-10-CM

## 2021-01-28 LAB
ADD MANUAL DIFF: NO
ALBUMIN SERPL-MCNC: 4.7 G/DL (ref 3.5–5)
ALP SERPL-CCNC: 85 U/L (ref 38–126)
ANION GAP SERPL CALC-SCNC: 11 MMOL/L (ref 5–19)
APPEARANCE UR: (no result)
APTT PPP: YELLOW S
AST SERPL-CCNC: 24 U/L (ref 14–36)
BASOPHILS # BLD AUTO: 0 10^3/UL (ref 0–0.2)
BASOPHILS NFR BLD AUTO: 0.3 % (ref 0–2)
BILIRUB DIRECT SERPL-MCNC: 0.2 MG/DL (ref 0–0.4)
BILIRUB SERPL-MCNC: 0.9 MG/DL (ref 0.2–1.3)
BILIRUB UR QL STRIP: NEGATIVE
BUN SERPL-MCNC: 17 MG/DL (ref 7–20)
CALCIUM: 9.8 MG/DL (ref 8.4–10.2)
CHLORIDE SERPL-SCNC: 100 MMOL/L (ref 98–107)
CO2 SERPL-SCNC: 26 MMOL/L (ref 22–30)
EOSINOPHIL # BLD AUTO: 0 10^3/UL (ref 0–0.6)
EOSINOPHIL NFR BLD AUTO: 0.3 % (ref 0–6)
ERYTHROCYTE [DISTWIDTH] IN BLOOD BY AUTOMATED COUNT: 13.3 % (ref 11.5–14)
GLUCOSE SERPL-MCNC: 132 MG/DL (ref 75–110)
GLUCOSE UR STRIP-MCNC: NEGATIVE MG/DL
HCT VFR BLD CALC: 42.3 % (ref 36–47)
HGB BLD-MCNC: 14 G/DL (ref 12–15.5)
KETONES UR STRIP-MCNC: (no result) MG/DL
LYMPHOCYTES # BLD AUTO: 0.6 10^3/UL (ref 0.5–4.7)
LYMPHOCYTES NFR BLD AUTO: 5.8 % (ref 13–45)
MCH RBC QN AUTO: 27.5 PG (ref 27–33.4)
MCHC RBC AUTO-ENTMCNC: 33.2 G/DL (ref 32–36)
MCV RBC AUTO: 83 FL (ref 80–97)
MONOCYTES # BLD AUTO: 0.3 10^3/UL (ref 0.1–1.4)
MONOCYTES NFR BLD AUTO: 2.6 % (ref 3–13)
NEUTROPHILS # BLD AUTO: 9.7 10^3/UL (ref 1.7–8.2)
NEUTS SEG NFR BLD AUTO: 91 % (ref 42–78)
NITRITE UR QL STRIP: NEGATIVE
PH UR STRIP: 8 [PH] (ref 5–9)
PLATELET # BLD: 273 10^3/UL (ref 150–450)
POTASSIUM SERPL-SCNC: 4.4 MMOL/L (ref 3.6–5)
PROT SERPL-MCNC: 8 G/DL (ref 6.3–8.2)
PROT UR STRIP-MCNC: 30 MG/DL
RBC # BLD AUTO: 5.1 10^6/UL (ref 3.72–5.28)
SP GR UR STRIP: 1.03
TOTAL CELLS COUNTED % (AUTO): 100 %
UROBILINOGEN UR-MCNC: NEGATIVE MG/DL (ref ?–2)
WBC # BLD AUTO: 10.7 10^3/UL (ref 4–10.5)

## 2021-01-28 PROCEDURE — 96361 HYDRATE IV INFUSION ADD-ON: CPT

## 2021-01-28 PROCEDURE — 96374 THER/PROPH/DIAG INJ IV PUSH: CPT

## 2021-01-28 PROCEDURE — 81001 URINALYSIS AUTO W/SCOPE: CPT

## 2021-01-28 PROCEDURE — 80053 COMPREHEN METABOLIC PANEL: CPT

## 2021-01-28 PROCEDURE — 74022 RADEX COMPL AQT ABD SERIES: CPT

## 2021-01-28 PROCEDURE — 85025 COMPLETE CBC W/AUTO DIFF WBC: CPT

## 2021-01-28 PROCEDURE — 99284 EMERGENCY DEPT VISIT MOD MDM: CPT

## 2021-01-28 PROCEDURE — 36415 COLL VENOUS BLD VENIPUNCTURE: CPT

## 2021-01-28 NOTE — ER DOCUMENT REPORT
Entered by ELINOR ROCHA SCRIBE  21 





Acting as scribe for:LEX CALIXTO MD





ED GI/





- General


Stated Complaint: LEFT ABDOMINAL PAIN,NAUSEA,VOMITING


Time Seen by Provider: 21 06:11


Primary Care Provider: 


DADA BECKER FNP [Primary Care Provider] - Follow up in 3-5 days


Mode of Arrival: Medic


Information source: Patient


Notes: 





This 45 year old female patient with a history of an incisional hernia due to C-

sections presents to the ED today via EMS with complaints of abdominal pain that

started around 2100 last night. Patient states that she although she was in 

pain, she was able to go to bed, but woke up around 0030 with severe pain and 

vomiting. She then states "I got dizzy and disoriented. I was worried I was 

going to pass out." She notes hard and soft stools recently and reports that she

has not been able to have a successful bowel movement this morning or last 

night. She received Zofran ODT via EMS.








TRAVEL OUTSIDE OF THE [place] IN LAST 30 DAYS: No





- Related Data


Allergies/Adverse Reactions: 


                                        





chlorpheniramine [From Atuss DS] Allergy (Severe, Verified 20 06:56)


   headache, n and v


dextromethorphan tannate [From Atuss DS] Allergy (Severe, Verified 20 

06:56)


   headache, n and v


pseudoephedrine tannate [From Atuss DS] Allergy (Severe, Verified 20 

06:56)


   headache, n and v


NSAIDS (Non-Steroidal Anti-Inflamma Adverse Reaction (Verified 20 06:56)


   











Past Medical History





- General


Information source: Patient, [facility] Records





- Social History


Smoking Status: Former Smoker


Cigarette use (# per day): No


Chew tobacco use (# tins/day): No


Smoking Education Provided: No


Family History: Reviewed & Not Pertinent, CAD, DM, Hypertension, Thyroid 

Disfunction





- Past Medical History


Cardiac Medical History: Reports: Hx Hypertension - no current meds


Pulmonary Medical History: Reports: Hx Bronchitis, Hx Pneumonia


Endocrine Medical History: Reports: Hx Diabetes Mellitus Type 2 - "borderline" 

per pt


GI Medical History: Reports: Hx Gastroesophageal Reflux Disease


Psychiatric Medical History: Reports: Hx Anxiety, Hx Depression


Past Surgical History: Reports: Hx  Section - x2, Hx Gastric Bypass 

Surgery - gastric sleeve, Hx Gynecologic Surgery - novasure, vag repair, Hx 

Tonsillectomy, Hx Tubal Ligation





- Immunizations


Immunizations up to date: No


Hx Diphtheria, Pertussis, Tetanus Vaccination: No





Review of Systems





- Review of Systems


Constitutional: No symptoms reported


EENT: No symptoms reported


Cardiovascular: See HPI, Dizziness


Respiratory: No symptoms reported


Gastrointestinal: See HPI, Abdominal pain, Nausea, Vomiting, Constipation


Genitourinary: No symptoms reported


Female Genitourinary: No symptoms reported


Musculoskeletal: No symptoms reported


Skin: No symptoms reported


Hematologic/Lymphatic: No symptoms reported


Neurological/Psychological: No symptoms reported


-: Yes All other systems reviewed and negative





Physical Exam





- Vital signs


Vitals: 


                                        











Temp Pulse Resp BP Pulse Ox


 


 98.7 F   87   20   143/64 H  96 


 


 21 06:18  21 06:18  21 06:18  21 06:18  21 06:18














- General


General appearance: Alert


In distress: None





- HEENT


Head: Normocephalic, Atraumatic


Eyes: Normal


Extraocular movements intact: Yes


Pupils: PERRL


Neck: Normal, Supple





- Respiratory


Respiratory status: No respiratory distress


Chest status: Nontender


Breath sounds: Normal


Chest palpation: Normal





- Cardiovascular


Rhythm: Regular


Heart sounds: Normal auscultation


Murmur: No





- Abdominal


Inspection: Morbidly Obese, Other - There is a very large hernia present in the 

left lower abdomen that is extremely tender to palpation


Distension: No distension


Bowel sounds: Normal


Tenderness: Tender - Patient reports pain across her upper abdomen which is 

typical of her recurrent hernial pain


Organomegaly: No organomegaly





- Back


Back: Normal, Nontender





- Extremities


General upper extremity: Normal inspection


General lower extremity: Normal inspection.  No: Edema





- Neurological


Neuro grossly intact: Yes


Orientation: AAOx4


[place] Coma Scale Eye Opening: Spontaneous


[place] Coma Scale Verbal: Oriented


[place] Coma Scale Motor: Obeys Commands


[place] Coma Scale Total: 15





- Psychological


Associated symptoms: Normal affect, Normal mood





- Skin


Skin Temperature: Warm


Skin Moisture: Dry


Skin Color: Normal





Course





- Re-evaluation


Re-evalutation: 





21 09:30


Abdominal films do not show air-fluid levels, do not suggest obstruction, and 

there is not much stool noted.  Lab work is unremarkable except for the urine 

that is a little concentrated.  She does have IV normal saline running at this 

time.


Patient reports taking a laxative and that her bowels have actually been moving 

better over the last day.  She states it still feels like she does not fully 

empty out.  She does have half of her intestinal contents within her extremely 

large hernia.  It sounds like she takes a laxative when she starts getting 

stopped up, but does not take anything regularly to prevent that from happening.

 On initial history, she had reported no bowel movement last night or today w

becki is not fit with the history of getting at this time.  When asked about 

MiraLAX, she states she has taken it but it did not work.  It sounds like she 

took it as an immediate relief laxative and was disappointed and quit using it. 

I have explained to her that if she takes MiraLAX on a daily basis, it may 

prevent her from having these problems develop.  Reviewing her history and 

previous films, I am inclined to treat her with tramadol and nausea medication. 

At this time I do not see a good reason to do another CT and expose her to 

radiation again.  Reviewing records shows she has had approximately 10 abdominal

CT scans in this facility in the past 10 years.





When I first walked into the room, the patient was relaxed and talking on the 

phone with someone.  By the time we finished discussing the findings and 

suggestions about treatment, she is moaning and groaning in severe pain.


21 09:39








- Vital Signs


Vital signs: 


                                        











Temp Pulse Resp BP Pulse Ox


 


 98.7 F   87   22 H  151/71 H  95 


 


 21 06:21  21 06:18  21 09:01  21 09:01  21 09:01














- Laboratory Results


Result Diagrams: 


                                 21 08:14





                                 21 08:14


Laboratory Results Interpreted: 


                                        











  21





  08:14 08:14 08:14


 


WBC  10.7 H  


 


Lymph % (Auto)  5.8 L  


 


Mono % (Auto)  2.6 L  


 


Absolute Neuts (auto)  9.7 H  


 


Seg Neutrophils %  91.0 H  


 


Sodium   136.9 L 


 


Glucose   132 H 


 


Urine Protein    30 H


 


Urine Ketones    TRACE H


 


Ur Leukocyte Esterase    TRACE H











Critical Laboratory Results Reviewed: No Critical Results





- Radiology Results


Critical Radiology Results Reviewed: No Critical Results





Discharge





- Discharge


Clinical Impression: 


 Morbid obesity





Ventral hernia


Qualifiers:


 Obstruction and gangrene presence: without obstruction or gangrene Qualified 

Code(s): K43.9 - Ventral hernia without obstruction or gangrene





Abdominal pain


Qualifiers:


 Abdominal location: generalized Qualified Code(s): R10.84 - Generalized 

abdominal pain





Condition: Stable


Disposition: HOME, SELF-CARE


Additional Instructions: 


Abdominal Pain





     There are many causes of abdominal pain.  Pain can mean a serious problem 

requiring surgery (such as appendicitis). It can also be an innocent problem rhoda

t goes away on its own (such as a viral infection).  Often, time must pass to 

determine the cause of pain.


     The physician does not feel that hospitalization is necessary, at present. 

Things may change within the next 24 hours. Call the doctor or come back for re-

examination if any problems occur, such as:


     (1) Pain that becomes more severe, steady, or becomes concentrated in one 

specific area.  Also, pain that is more severe with movement or coughing.  


     (2) Vomiting that persists or becomes more frequent.  


     (3) Blood in the vomitus, urine, or bowel movements.  Blood in the stool 

may have a tarry or black appearance.


     (4) Shaking chills or fever greater than 100 degrees F. 


     (5) The abdomen becomes more distended or swollen. 


     (6) Bowel movements cease. 


     (7) Failure to improve as expected.





****************************************************************

*******************************************************





The pain you have been experiencing seems to be cramping pain probably related 

to gas in your intestines.  This pain seems to cause nausea and vomiting.


The x-rays today did not suggest a bowel obstruction.  Your lab work does not 

suggest an infectious process.


You should take something like MiraLAX on a daily basis to help prevent 

constipation from developing.


Take the Zofran and tramadol as prescribed for nausea and pain when needed.


Follow-up with your primary care provider to work out a long-term management 

regimen for your recurring abdominal pain with nausea and vomiting.





RETURN TO THE EMERGENCY ROOM IF ANY NEW OR WORSENING SYMPTOMS.








Prescriptions: 


Tramadol HCl [Ultram] 50 mg PO ASDIR PRN #30 tablet


 PRN Reason: 


Ondansetron [Zofran Odt 4 mg Tablet] 1 - 2 tab PO Q4H PRN #20 tab.rapdis


 PRN Reason: 


Referrals: 


DADA BECKER FNP [Primary Care Provider] - Follow up in 3-5 days





I personally performed the services described in the documentation, reviewed and

edited the documentation which was dictated to the scribe in my presence, and it

accurately records my words and actions.

## 2021-01-28 NOTE — RADIOLOGY REPORT (SQ)
Acute abdominal series x-ray three views on 1/28/2021 at 7:07 AM



CLINICAL INDICATION: Large ventral hernia, generalized abdominal

pain, constipation



COMPARISON: 10/3/2020



FINDINGS:



CHEST: The lungs are clear. Cardiac, hilar and mediastinal

contours are within normal limits. Pulmonary vascularity is

within normal limits.



ABDOMEN: There is no free air. Postsurgical changes from gastric

sleeve surgery are noted. Calcification in the left pelvis is

consistent with phlebolith. Bowel gas pattern is nonspecific with

a single air-fluid level in the right lower quadrant. No other

abnormal calcification or mass effect is noted. Degenerative

changes are noted in the spine. No increased stool to suggest

constipation is noted.



IMPRESSION:

1. No acute cardiopulmonary disease.

2. Nonspecific abdomen.